# Patient Record
Sex: MALE | Race: WHITE | Employment: OTHER | URBAN - METROPOLITAN AREA
[De-identification: names, ages, dates, MRNs, and addresses within clinical notes are randomized per-mention and may not be internally consistent; named-entity substitution may affect disease eponyms.]

---

## 2017-02-10 PROBLEM — R07.89 CHEST DISCOMFORT: Status: ACTIVE | Noted: 2017-02-10

## 2017-02-10 PROBLEM — I44.0 FIRST DEGREE AV BLOCK: Status: ACTIVE | Noted: 2017-02-10

## 2017-02-10 PROBLEM — Z88.9 H/O SEASONAL ALLERGIES: Status: ACTIVE | Noted: 2017-02-10

## 2017-02-23 PROBLEM — R06.02 SHORTNESS OF BREATH: Status: ACTIVE | Noted: 2017-02-23

## 2017-03-23 PROBLEM — I35.1 NONRHEUMATIC AORTIC VALVE INSUFFICIENCY: Status: ACTIVE | Noted: 2017-03-23

## 2017-08-30 PROBLEM — Z00.00 ROUTINE GENERAL MEDICAL EXAMINATION AT A HEALTH CARE FACILITY: Status: ACTIVE | Noted: 2017-08-30

## 2018-03-01 PROBLEM — G89.29 CHRONIC LEFT SHOULDER PAIN: Status: ACTIVE | Noted: 2018-03-01

## 2018-03-01 PROBLEM — M25.512 CHRONIC LEFT SHOULDER PAIN: Status: ACTIVE | Noted: 2018-03-01

## 2018-03-07 PROBLEM — N13.8 BPH WITH OBSTRUCTION/LOWER URINARY TRACT SYMPTOMS: Status: ACTIVE | Noted: 2018-03-07

## 2018-03-07 PROBLEM — N40.1 BPH WITH OBSTRUCTION/LOWER URINARY TRACT SYMPTOMS: Status: ACTIVE | Noted: 2018-03-07

## 2018-09-14 PROBLEM — Z85.820 HISTORY OF MELANOMA EXCISION: Status: ACTIVE | Noted: 2018-09-14

## 2018-09-14 PROBLEM — Z11.59 SCREENING FOR VIRAL DISEASE: Status: ACTIVE | Noted: 2018-09-14

## 2018-09-14 PROBLEM — Z23 ENCOUNTER FOR IMMUNIZATION: Status: ACTIVE | Noted: 2018-09-14

## 2018-09-14 PROBLEM — Z98.890 HISTORY OF MELANOMA EXCISION: Status: ACTIVE | Noted: 2018-09-14

## 2019-01-08 PROBLEM — M54.32 SCIATICA OF LEFT SIDE: Status: ACTIVE | Noted: 2019-01-08

## 2019-03-11 ENCOUNTER — HOSPITAL ENCOUNTER (OUTPATIENT)
Dept: LAB | Age: 74
Discharge: HOME | End: 2019-03-11
Payer: MEDICARE

## 2019-03-11 ENCOUNTER — HOSPITAL ENCOUNTER (OUTPATIENT)
Dept: GENERAL RADIOLOGY | Age: 74
Discharge: HOME | End: 2019-03-11

## 2019-03-11 DIAGNOSIS — R19.5 CHANGE IN STOOL: ICD-10-CM

## 2019-03-11 DIAGNOSIS — R05.3 PERSISTENT COUGH: ICD-10-CM

## 2019-03-11 LAB
BASOPHILS # BLD: 0 K/UL (ref 0–0.2)
BASOPHILS NFR BLD: 0 % (ref 0–2)
DIFFERENTIAL METHOD BLD: ABNORMAL
EOSINOPHIL # BLD: 0.1 K/UL (ref 0–0.8)
EOSINOPHIL NFR BLD: 1 % (ref 0.5–7.8)
ERYTHROCYTE [DISTWIDTH] IN BLOOD BY AUTOMATED COUNT: 13.3 % (ref 11.9–14.6)
HCT VFR BLD AUTO: 46.6 % (ref 41.1–50.3)
HGB BLD-MCNC: 16.4 G/DL (ref 13.6–17.2)
IMM GRANULOCYTES # BLD AUTO: 0.1 K/UL (ref 0–0.5)
IMM GRANULOCYTES NFR BLD AUTO: 1 % (ref 0–5)
LYMPHOCYTES # BLD: 3 K/UL (ref 0.5–4.6)
LYMPHOCYTES NFR BLD: 30 % (ref 13–44)
MCH RBC QN AUTO: 32.1 PG (ref 26.1–32.9)
MCHC RBC AUTO-ENTMCNC: 35.2 G/DL (ref 31.4–35)
MCV RBC AUTO: 91.2 FL (ref 79.6–97.8)
MONOCYTES # BLD: 0.8 K/UL (ref 0.1–1.3)
MONOCYTES NFR BLD: 8 % (ref 4–12)
NEUTS SEG # BLD: 6.2 K/UL (ref 1.7–8.2)
NEUTS SEG NFR BLD: 60 % (ref 43–78)
NRBC # BLD: 0 K/UL (ref 0–0.2)
PLATELET # BLD AUTO: 382 K/UL (ref 150–450)
PMV BLD AUTO: 9.3 FL (ref 9.4–12.3)
RBC # BLD AUTO: 5.11 M/UL (ref 4.23–5.6)
WBC # BLD AUTO: 10.3 K/UL (ref 4.3–11.1)

## 2019-03-11 PROCEDURE — 36415 COLL VENOUS BLD VENIPUNCTURE: CPT

## 2019-03-11 PROCEDURE — 85025 COMPLETE CBC W/AUTO DIFF WBC: CPT

## 2019-03-11 NOTE — PROGRESS NOTES
Pt was made aware of his lab work and also his x-ray. GI has called pt and pt has jessica there tomorrow at 2:15.

## 2019-03-15 NOTE — PROGRESS NOTES
Pt informed me since taking his cough med he is feeling \"much better, less coughing and getting much needed sleep\"/   FYI: Pt saw GI and had consultation done and he is scheduled for EGD 3/27/19.

## 2019-03-22 PROBLEM — J68.3 REACTIVE AIRWAYS DYSFUNCTION SYNDROME WITH ACUTE EXACERBATION (HCC): Status: ACTIVE | Noted: 2019-03-22

## 2019-03-22 PROBLEM — I35.1 AORTIC VALVE REGURGITATION: Status: ACTIVE | Noted: 2019-03-22

## 2019-03-22 PROBLEM — Z13.6 SCREENING FOR AAA (ABDOMINAL AORTIC ANEURYSM): Status: ACTIVE | Noted: 2019-03-22

## 2019-03-26 ENCOUNTER — HOSPITAL ENCOUNTER (OUTPATIENT)
Dept: LAB | Age: 74
Discharge: HOME | End: 2019-03-26

## 2019-03-26 PROCEDURE — 88305 TISSUE EXAM BY PATHOLOGIST: CPT

## 2019-03-26 PROCEDURE — 88312 SPECIAL STAINS GROUP 1: CPT

## 2019-03-27 ENCOUNTER — HOSPITAL ENCOUNTER (OUTPATIENT)
Dept: ULTRASOUND IMAGING | Age: 74
Discharge: HOME | End: 2019-03-27
Attending: INTERNAL MEDICINE

## 2019-03-27 DIAGNOSIS — Z13.6 SCREENING FOR AAA (ABDOMINAL AORTIC ANEURYSM): ICD-10-CM

## 2019-04-02 ENCOUNTER — HOSPITAL ENCOUNTER (OUTPATIENT)
Dept: PHYSICAL THERAPY | Age: 74
Discharge: HOME | End: 2019-04-02
Attending: INTERNAL MEDICINE
Payer: MEDICARE

## 2019-04-02 DIAGNOSIS — M54.32 SCIATICA OF LEFT SIDE: ICD-10-CM

## 2019-04-02 PROCEDURE — 97161 PT EVAL LOW COMPLEX 20 MIN: CPT

## 2019-04-02 PROCEDURE — 97110 THERAPEUTIC EXERCISES: CPT

## 2019-04-02 NOTE — PROGRESS NOTES
Edwin Segundo  : 1945  [place]  Secondary: Darren Suggs at [facility], [address] [facility]  [phone]   [phone]      OUTPATIENT PHYSICAL THERAPY: Daily Treatment Note 2019  Pre-treatment Symptoms/Complaints:  L sided sciatica  Pain: Initial: Pain Intensity 1: 9 /10 Post Session:  9/10   Medications Last Reviewed:  2019  Updated Objective Findings:  See evaluation note from today   TREATMENT:     Therapeutic Exercise: (25 Minutes):  Exercises per grid below to improve mobility and strength. Required moderate verbal and manual cues to promote proper body alignment and promote proper body mechanics. Progressed range as indicated. Date:  2019   Activity/Exercise Parameters   Thoracic rotation 5 minutes   Lumbar rotation 5 minutes   Knee side to side 5 minutes   Piriformis stretch 5 minutes   Quadriceps stretch 5 minutes               MedBridge Portal  Treatment/Session Summary:    · Response to Treatment:  Pt. tolerates intial flexibility program without complaints today. No reproduction of symptoms occurs today. .  · Communication/Consultation:  None today  · Equipment provided today:  None today  · Recommendations/Intent for next treatment session: Next visit will focus on Flexibility and ROM exercises.  .  Treatment Plan of Care Effective Dates:  19 to 19  Total Treatment Billable Duration:  25 minutes therapeutic exercise, 35 minutes evaluation  PT Patient Time In/Time Out  Time In: 1130  Time Out: [address], PT    Future Appointments   Date Time Provider Miguelito Hawthorne   4/10/2019 10:30 AM Eduin Rodolfo, PT [facility]   2019  8:30 AM Eduin Rodolfo, PT [facility]   2019  9:30 AM Eduin Rodolfo, PT [facility]   2019 10:30 AM [place] Rodolfo, PT [facility]   2019  9:30 AM [place] Rodolfo, PT [facility]   2019 10:30 AM Adenike Best, PT [facility]   5/1/2019  9:30 AM Adenike Best, PT [facility]   5/2/2019 10:30 AM Adenike Best, PT [facility]   6/19/2019 10:00 AM Timmy Galvan MD [facility]   3/25/2020 10:10 AM Shante Fuentes MD [facility]

## 2019-04-02 NOTE — THERAPY EVALUATION
Minoo Velez  : 1945  [place]  Secondary: Darren Mendoza 75 at [address]  [phone]   [phone]        OUTPATIENT PHYSICAL THERAPY:Initial Assessment 2019   ICD-10: Treatment Diagnosis: Sciatica, L side [M54.32], Low Back Pain [M54.5]  Precautions/Allergies:   Latex; Adhesive; Efudex [fluorouracil]; Nexium [esomeprazole magnesium]; Paba [p-aminobenzoic acid]; Retin-a [tretinoin]; and Viagra [sildenafil]   MD Orders: Evaluate and Treat MEDICAL/REFERRING DIAGNOSIS:  Sciatica of left side [M54.32]   DATE OF ONSET: 2019  REFERRING PHYSICIAN: Gian Rodgers MD  RETURN PHYSICIAN APPOINTMENT: TBD     INITIAL ASSESSMENT:  Mr. Rand Mcintyre presents with signs and symptoms of L LE sciatica. Source of L LE sciatica is undetermined at this time. Lumbar testing is (-) with absence of reproduction of symptoms with repeated lumbar flexion or extension, absence of reproduction with central p/a to the lumbar spine, (-) slump testing, and negative SLR testing. SI joint provocation is negative additionally. Pt. Does not demonstrates tenderness along the piriformis, but does present with flexibility limitations in the L hip external rotators. Other finding include general flexibility and ROM deficits in the hip and spine. Pt. Will benefit from a flexibility/mobility program to address pain and improve functional walking endurance without pain. PROBLEM LIST (Impacting functional limitations):  1. Decreased Strength  2. Decreased ADL/Functional Activities  3. Increased Pain  4. Decreased Flexibility/Joint Mobility  5. Decreased [place] with Home Exercise Program INTERVENTIONS PLANNED: (Treatment may consist of any combination of the following)  1. Home Exercise Program (HEP)  2. Manual Therapy  3. Range of Motion (ROM)  4.  Therapeutic Exercise/Strengthening   TREATMENT PLAN:  Effective Dates: 4/2/2019 TO 7/1/2019 (90 days). Frequency/Duration: 2 times a week for 90 Day(s)  GOALS: (Goals have been discussed and agreed upon with patient.)  Discharge Goals: Time Frame: 8 weeks  1. Pt. Will walk for >15 minutes with 0/10 L LE pain to improve walking capacity. 2. Pt. Will be independent with HEP to improve pain and prevent return of symptoms. 3. Pt. Will score <20% on the RAYMOND to demonstrate improved pain and function. OUTCOME MEASURE:   Tool Used: Modified Oswestry Low Back Pain Questionnaire  Score:  Initial: 13/50  Most Recent: X/50 (Date: -- )   Interpretation of Score: Each section is scored on a 0-5 scale, 5 representing the greatest disability. The scores of each section are added together for a total score of 50. MEDICAL NECESSITY:   · Patient is expected to demonstrate progress in strength and range of motion to increase independence with standing, walking, and ADL's.  REASON FOR SERVICES/OTHER COMMENTS:  · Patient will benefit from skilled PT to address impairments identified through evaluation and return to previous ADL and recreational capacity. Total Duration:  PT Patient Time In/Time Out  Time In: 1130  Time Out: 1230    Rehabilitation Potential For Stated Goals: Good       PAIN/SUBJECTIVE:   Initial: Pain Intensity 1: 9 /10 Post Session:  9/10   HISTORY:   History of Injury/Illness (Reason for Referral):  Pt. Attends PT c/o L sided LBP with radicular symptoms traveling down the gluteal region, hamstring, lateral calf, and bottom of the foot. Pt. Reports gradual insidious onset in mid February. The pain is aggravated with prolonged walking (>15 minutes) and with prolonged standing. The pain takes a while to subside after sitting and the only thing that helps is using a heating pad. Pt. Notes steroid dose pack failed to improve symptoms. Pt. Would like to address pain with walking and resume normal recreational and ADL activities.    Past Medical History/Comorbidities:   Mr. Sourav Matt  has a past medical history of Abnormal glucose (5/26/2015), Actinic keratoses (7/5/2016), Benign essential HTN (5/26/2015), BPH (benign prostatic hypertrophy) (5/26/2015), Calculus of kidney (5/26/2015), Chronic insomnia (5/26/2015), Degeneration of cervical intervertebral disc (5/26/2015), Edema (5/26/2015), Elevated PSA (4/22/2015), Essential tremor (5/26/2015), GERD (gastroesophageal reflux disease) (5/26/2015), Hyperlipidemia, Impotence of organic origin, Mild sleep apnea (5/26/2015), Skin cancer (8/1/2016), and SNHL (sensorineural hearing loss) (8/1/2016). Mr. Sourav Matt  has a past surgical history that includes hx orthopaedic; hx prostatectomy; hx heent; hx urological (2007); hx urological (2012); hx malignant skin lesion excision; hx hernia repair (05/2006); hx other surgical (03/2013); and hx other surgical.  Social History/Living Environment:     Lives with spouse in two story home. Prior Level of Function/Work/Activity:  Functioned independently without limitations. Ambulatory/Rehab Services H2 Model Falls Risk Assessment   Risk Factors:       (1)  Gender [Male] Ability to Rise from Chair:       (0)  Ability to rise in a single movement   Falls Prevention Plan:       No modifications necessary   Total: (5 or greater = High Risk): 1   ©2010 [facility]. All Rights Reserved. [facility] Patent #[id number]. Federal Law prohibits the replication, distribution or use without written permission from [facility]   Current Medications:       Current Outpatient Medications:     albuterol (PROVENTIL HFA, VENTOLIN HFA, PROAIR HFA) 90 mcg/actuation inhaler, Take 1 Puff by inhalation every four (4) hours as needed for Wheezing., Disp: 1 Inhaler, Rfl: 3    omeprazole (PRILOSEC) 20 mg capsule, Take 20 mg by mouth daily. , Disp: , Rfl:     tamsulosin (FLOMAX) 0.4 mg capsule, Take 1 Cap by mouth daily. , Disp: 90 Cap, Rfl: 4    atorvastatin (LIPITOR) 10 mg tablet, Take 10 mg by mouth daily. , Disp: , Rfl:     montelukast (SINGULAIR) 10 mg tablet, TAKE 1 TABLET BY MOUTH  DAILY, Disp: 90 Tab, Rfl: 4    amLODIPine (NORVASC) 10 mg tablet, Take 1 Tab by mouth daily. Indications: hypertension, Disp: 90 Tab, Rfl: 3    triamterene-hydroCHLOROthiazide (MAXZIDE) 37.5-25 mg per tablet, Take 1 Tab by mouth daily. , Disp: 90 Tab, Rfl: 3    fexofenadine (ALLEGRA) 180 mg tablet, Take 180 mg by mouth daily. , Disp: , Rfl:     aspirin 81 mg tablet, Take 81 mg by mouth daily. , Disp: , Rfl:    Date Last Reviewed:  4/2/2019   Number of Personal Factors/Comorbidities that affect the Plan of Care: 0: LOW COMPLEXITY   EXAMINATION:   OBSERVATION: --    Palpation/Joint Mobility:   Left Right   Lumbar Paraspinals -- --   Quadratus Lumborum -- --   Gluteals -- --   Thoracic Spine normal normal   Lumbar Spine hypomobile hypomobile     Range of Motion: Lumbar  Flexion 80  Repeated Movement   Extension 25  Flex: (-)   Side Bend L: 20 R: 20 Ext: (-)     Range of Motion: Hip  Extension limited limited   Flexion -- --   Internal Rotation limited limited   External Rotation -- --     Strength:   Left Right   Hip Extensors 4/5 4/5   Hip Abductors 4/5 4/5   Functional           Other:       Flexibility:                                                                             Neuroscreen:  Quadriceps  limited Reflexes Sensation   Iliopsoas limited L2-4: 1+ normal   Hamstrings limited S1-2: 1+    Gluteals -- Babinski:--    Triceps Surae limited       Special Testing:   Left Right   Straight Leg Raise (-) (-)   Slump (-) (-)   Femoral Nerve  (-) (-)   Aberrant Movements     Prone Instability Test     TATIANA (+) (+)   JORDYN Curiel (+) (+)        Body Structures Involved:  1. Joints  2. Muscles Body Functions Affected:  1. Neuromusculoskeletal  2. Movement Related Activities and Participation Affected:  1. Mobility  2.  Self Care   Number of elements (examined above) that affect the Plan of Care: 1-2: LOW COMPLEXITY   CLINICAL PRESENTATION:   Presentation: Stable and uncomplicated: LOW COMPLEXITY   CLINICAL DECISION MAKING:   Use of outcome tool(s) and clinical judgement create a POC that gives a: Clear prediction of patient's progress: LOW COMPLEXITY

## 2019-04-10 ENCOUNTER — APPOINTMENT (OUTPATIENT)
Dept: PHYSICAL THERAPY | Age: 74
End: 2019-04-10
Attending: INTERNAL MEDICINE
Payer: MEDICARE

## 2019-04-11 ENCOUNTER — HOSPITAL ENCOUNTER (OUTPATIENT)
Dept: PHYSICAL THERAPY | Age: 74
Discharge: HOME | End: 2019-04-11
Attending: INTERNAL MEDICINE
Payer: MEDICARE

## 2019-04-11 PROCEDURE — 97140 MANUAL THERAPY 1/> REGIONS: CPT

## 2019-04-11 PROCEDURE — 97110 THERAPEUTIC EXERCISES: CPT

## 2019-04-11 NOTE — PROGRESS NOTES
Lex Sumner  : 1945  [place]  Secondary: Darren Suggs at [address]  [phone]   [phone]      OUTPATIENT PHYSICAL THERAPY: Daily Treatment Note 2019  Pre-treatment Symptoms/Complaints:  Pt. Reports he has not been able to work on HEP due to surgery to remove a skin cancer last week. Pain: Initial: Pain Intensity 1: 8 /10 Post Session:  8/10   Medications Last Reviewed:  2019  Updated Objective Findings:  Pt. walks for 3 minutes prior to onset of L LE radicular symptoms   TREATMENT:     Therapeutic Exercise: (40 Minutes):  Exercises per grid below to improve mobility and strength. Required moderate verbal and manual cues to promote proper body alignment and promote proper body mechanics. Progressed range as indicated. Date:  2019   Activity/Exercise Parameters   Thoracic rotation --   Lumbar rotation 5 minutes   Knee side to side 5 minutes   Piriformis stretch 5 minutes   Quadriceps stretch 5 minutes   Sciatic nerve glide 3 x 10   Sidelying hip abduction 3 x 10   walking 6 minutes   Moisés test stretch 4 minutes   Pelvic tilting 3 x 10   Lumbar flexion 3 minutes   Manual Therapy (    Soft Tissue Mobilization Duration  Duration: 15 Minutes): Manual techniques to facilitate improved motion and decreased pain. (Used abbreviations: MET - muscle energy technique; PNF - proprioceptive neuromuscular facilitation; NMR - neuromuscular re-education; a/p - anterior to posterior; p/a - posterior to anterior)   · Soft tissue mobilization: L gluteals and piriformis muscle  · Joint mobilization: posterior glides of L hip in supine grade III  · Joint mobilization: lumbar spine rotation in sidelying grade IV      MedBridge Portal  Treatment/Session Summary:    · Response to Treatment:  Radicular symptoms are only produced during walking today.   symptoms centralize with seated lumbar flexion today,  . · Communication/Consultation:  None today  · Equipment provided today:  None today  · Recommendations/Intent for next treatment session: Next visit will focus on Flexibility and ROM exercises.  .  Treatment Plan of Care Effective Dates:  4/2/19 to 7/1/19  Total Treatment Billable Duration:  55 minutes  PT Patient Time In/Time Out  Time In: 0830  Time Out: [address] [facility], PT    Future Appointments   Date Time Provider Miguelito Hawthorne   4/16/2019  9:30 AM Kaleta Burner, PT [facility]   4/18/2019 10:30 AM Kaleta Burner, PT [facility]   4/23/2019  9:30 AM Kaleta Burner, PT [facility]   4/25/2019 10:30 AM Kaleta Burner, PT [facility]   4/30/2019  9:30 AM Kaleta Burner, PT [facility]   5/1/2019  9:30 AM Kaleta Burner, PT [facility]   5/2/2019 10:30 AM Kaleta Burner, PT [facility]   5/7/2019  9:30 AM Kaleta Burner, PT [facility]   6/19/2019 10:00 AM Ayan Blum MD [facility]   3/25/2020 10:10 AM Wanda Coburn MD [facility] PGT PGU

## 2019-04-16 ENCOUNTER — HOSPITAL ENCOUNTER (OUTPATIENT)
Dept: PHYSICAL THERAPY | Age: 74
Discharge: HOME | End: 2019-04-16
Attending: INTERNAL MEDICINE
Payer: MEDICARE

## 2019-04-16 PROCEDURE — 97110 THERAPEUTIC EXERCISES: CPT

## 2019-04-16 PROCEDURE — 97140 MANUAL THERAPY 1/> REGIONS: CPT

## 2019-04-16 NOTE — PROGRESS NOTES
Alex Cassette  : 1945  [place]  Secondary: Darren Suggs at [address]  [phone]   [phone]      OUTPATIENT PHYSICAL THERAPY: Daily Treatment Note 2019  Pre-treatment Symptoms/Complaints:  Pt. Notes some improvement in L LE symptoms since last PT session. Pt. Notes he was pain free on Saturday. Pain: Initial: Pain Intensity 1: 7 /10 Post Session:  7/10   Medications Last Reviewed:  2019  Updated Objective Findings:  None Today   TREATMENT:     Therapeutic Exercise: (40 Minutes):  Exercises per grid below to improve mobility and strength. Required moderate verbal and manual cues to promote proper body alignment and promote proper body mechanics. Progressed range as indicated. Date:  2019   Activity/Exercise Parameters   Thoracic rotation --   Lumbar rotation 5 minutes   Knee side to side 5 minutes   Piriformis stretch 5 minutes   Quadriceps stretch 5 minutes   Sciatic nerve glide 3 x 10   Sidelying hip abduction 3 x 10   walking --   Moisés test stretch 4 minutes   Pelvic tilting 3 x 10   Lumbar flexion 3 minutes   clams 3 x 15       Manual Therapy (    Soft Tissue Mobilization Duration  Duration: 15 Minutes): Manual techniques to facilitate improved motion and decreased pain. (Used abbreviations: MET - muscle energy technique; PNF - proprioceptive neuromuscular facilitation; NMR - neuromuscular re-education; a/p - anterior to posterior; p/a - posterior to anterior)   · Soft tissue mobilization: L gluteals and piriformis muscle  · Joint mobilization: posterior glides of L hip in supine grade III  · Joint mobilization: lumbar spine rotation in sidelying grade IV      MedBridge Portal  Treatment/Session Summary:    · Response to Treatment:  Pt. tolerates flexibility and ROM exercises today without complaints.   Pt. has difficulty with pelvic tilt secondary to flexibility limitations in the hip and lumbar spine. .  · Communication/Consultation:  None today  · Equipment provided today:  None today  · Recommendations/Intent for next treatment session: Next visit will focus on Flexibility and ROM exercises.  .  Treatment Plan of Care Effective Dates:  4/2/19 to 7/1/19  Total Treatment Billable Duration:  55 minutes  PT Patient Time In/Time Out  Time In: 0930  Time Out: [address], PT    Future Appointments   Date Time Provider Miguelito Hawthorne   4/18/2019 10:30 AM Junious Jumbo, PT [facility]   4/23/2019  9:30 AM Junious Jumbo, PT [facility]   4/25/2019 10:30 AM Junious Jumbo, PT [facility]   4/30/2019  9:30 AM Junious Jumbo, PT [facility]   5/1/2019  9:30 AM Junious Jumbo, PT [facility]   5/2/2019 10:30 AM Junious Jumbo, PT [facility]   5/7/2019  9:30 AM Junious Jumbo, PT [facility]   6/19/2019 10:00 AM Rk Garcia MD [facility]   3/25/2020 10:10 AM Tarsha Aguiar MD [facility]

## 2019-04-18 ENCOUNTER — HOSPITAL ENCOUNTER (OUTPATIENT)
Dept: PHYSICAL THERAPY | Age: 74
Discharge: HOME | End: 2019-04-18
Attending: INTERNAL MEDICINE
Payer: MEDICARE

## 2019-04-18 PROCEDURE — 97140 MANUAL THERAPY 1/> REGIONS: CPT

## 2019-04-18 PROCEDURE — 97110 THERAPEUTIC EXERCISES: CPT

## 2019-04-18 NOTE — PROGRESS NOTES
Saima Farias  : 1945  [place]  Secondary: Darren Mendoza [address] at [address]  [phone]   [phone]      OUTPATIENT PHYSICAL THERAPY: Daily Treatment Note 2019  Pre-treatment Symptoms/Complaints:  Pt. Reports moderate global soreness following last PT session. Pt. Notes pain remains consistent in the AM upon rising. Pain: Initial: Pain Intensity 1: 7 /10 Post Session:  7/10   Medications Last Reviewed:  2019  Updated Objective Findings:  Thoracic and lumbar spine hypomobile   TREATMENT:     Therapeutic Exercise: (40 Minutes):  Exercises per grid below to improve mobility and strength. Required moderate verbal and manual cues to promote proper body alignment and promote proper body mechanics. Progressed range as indicated. Date:  2019   Activity/Exercise Parameters   Thoracic rotation --   Lumbar rotation 5 minutes   Knee side to side 5 minutes   Piriformis stretch 5 minutes   Quadriceps stretch 5 minutes   Sciatic nerve glide --   Sidelying hip abduction 3 x 10   walking --   Moisés test stretch 4 minutes   Pelvic tilting 3 x 10   Lumbar flexion 3 minutes   clams 3 x 15   Sit to stand 3 x 10   Manual Therapy (    Soft Tissue Mobilization Duration  Duration: 15 Minutes): Manual techniques to facilitate improved motion and decreased pain.  (Used abbreviations: MET - muscle energy technique; PNF - proprioceptive neuromuscular facilitation; NMR - neuromuscular re-education; a/p - anterior to posterior; p/a - posterior to anterior)   · Soft tissue mobilization: L gluteals and piriformis muscle  · Joint mobilization: posterior glides of L hip in supine grade III  · Joint mobilization: lumbar spine rotation in sidelying grade IV      MedBridge Portal  Treatment/Session Summary:    · Response to Treatment:  Pt. tolerates manual therapy and flexibility exercises without complaints. Intensity of L LE pain appears improved this week. .  · Communication/Consultation:  None today  · Equipment provided today:  None today  · Recommendations/Intent for next treatment session: Next visit will focus on Flexibility and ROM exercises.  .  Treatment Plan of Care Effective Dates:  4/2/19 to 7/1/19  Total Treatment Billable Duration:  55 minutes  PT Patient Time In/Time Out  Time In: 1030  Time Out: [facility], PT    Future Appointments   Date Time Provider Miguelito Hawthorne   4/23/2019  9:30 AM Junious Jumbo, PT [facility]   4/25/2019 10:30 AM Junious Jumbo, PT [facility]   4/30/2019  9:30 AM Junious Jumbo, PT [facility]   5/1/2019  9:30 AM Junious Jumbo, PT [facility]   5/2/2019 10:30 AM Junious Jumbo, PT [facility]   5/7/2019  9:30 AM Junious Jumbo, PT [facility]   6/19/2019 10:00 AM Rk Garcia MD [facility]   3/25/2020 10:10 AM Tarsha Aguiar MD [facility]

## 2019-04-23 ENCOUNTER — HOSPITAL ENCOUNTER (OUTPATIENT)
Dept: PHYSICAL THERAPY | Age: 74
Discharge: HOME | End: 2019-04-23
Attending: INTERNAL MEDICINE
Payer: MEDICARE

## 2019-04-23 PROCEDURE — 97110 THERAPEUTIC EXERCISES: CPT

## 2019-04-23 PROCEDURE — 97140 MANUAL THERAPY 1/> REGIONS: CPT

## 2019-04-23 NOTE — PROGRESS NOTES
Cathy Crockett  : 1945  [place]  Secondary: Darren Suggs at [address]  [phone]   [phone]      OUTPATIENT PHYSICAL THERAPY: Daily Treatment Note 2019  Pre-treatment Symptoms/Complaints:  Pt. Notes some improvement of L gluteal pain upon rising but notes pain returned with walking a 1/4 of a mile. Pain: Initial: Pain Intensity 1: 7 /10 Post Session:  7/10   Medications Last Reviewed:  2019  Updated Objective Findings:  None Today   TREATMENT:     Therapeutic Exercise: (40 Minutes):  Exercises per grid below to improve mobility and strength. Required moderate verbal and manual cues to promote proper body alignment and promote proper body mechanics. Progressed range as indicated. Date:  2019   Activity/Exercise Parameters   Thoracic rotation --   Lumbar rotation 5 minutes   Knee side to side --   Piriformis stretch 5 minutes   Quadriceps stretch 5 minutes   Sciatic nerve glide 3 x 10   Sidelying hip abduction 3 x 10   walking 4 minutes   Moisés test stretch 4 minutes   Pelvic tilting 3 x 10   Lumbar flexion 3 minutes   clams --   Sit to stand 3 x 10   Hip extension 3 x 15   Quadruped LE extensions 3 x 15   Manual Therapy (    Soft Tissue Mobilization Duration  Duration: 15 Minutes): Manual techniques to facilitate improved motion and decreased pain.  (Used abbreviations: MET - muscle energy technique; PNF - proprioceptive neuromuscular facilitation; NMR - neuromuscular re-education; a/p - anterior to posterior; p/a - posterior to anterior)   · Soft tissue mobilization: L gluteals and piriformis muscle  · Joint mobilization: posterior glides of L hip in supine grade III  · Joint mobilization: lumbar spine rotation in sidelying grade IV      MedBridge Portal  Treatment/Session Summary:    · Response to Treatment:  Pt. experiences L LE symptoms during 4 minute walk. Pain is mostly located in the L calf with dull ache in hamstring and gluteal region. Pain improves with lumbar flexion ROM. Pt. is initiated on increased gluteal and hip strengthening exercises today. .  · Communication/Consultation:  None today  · Equipment provided today:  None today  · Recommendations/Intent for next treatment session: Next visit will focus on Flexibility and ROM exercises with increase in LE strengthening.   Treatment Plan of Care Effective Dates:  4/2/19 to 7/1/19  Total Treatment Billable Duration:  55 minutes  PT Patient Time In/Time Out  Time In: 0930  Time Out: [address] [facility],     Future Appointments   Date Time Provider Miguelito Hawthorne   4/25/2019 10:30 AM Jhoan Epp, PT [facility]   4/30/2019  9:30 AM Jhoan Epp, PT [facility]   5/1/2019  9:30 AM Jhoan Epp, PT [facility]   5/2/2019 10:30 AM [place] Epp, PT [facility]   5/7/2019  9:30 AM Jhoan Epp, PT [facility]   6/19/2019 10:00 AM Rodger Fagan MD [facility]   3/25/2020 10:10 AM Jered Gong MD [facility]

## 2019-04-25 ENCOUNTER — APPOINTMENT (OUTPATIENT)
Dept: PHYSICAL THERAPY | Age: 74
End: 2019-04-25
Attending: INTERNAL MEDICINE
Payer: MEDICARE

## 2019-04-30 ENCOUNTER — HOSPITAL ENCOUNTER (OUTPATIENT)
Dept: PHYSICAL THERAPY | Age: 74
Discharge: HOME | End: 2019-04-30
Attending: INTERNAL MEDICINE
Payer: MEDICARE

## 2019-04-30 PROCEDURE — 97140 MANUAL THERAPY 1/> REGIONS: CPT

## 2019-04-30 PROCEDURE — 97110 THERAPEUTIC EXERCISES: CPT

## 2019-04-30 NOTE — PROGRESS NOTES
Alex Cassette  : 1945  [place]  Secondary: Darren Suggs at [address]  [phone]   [phone]      OUTPATIENT PHYSICAL THERAPY: Daily Treatment Note 2019  Pre-treatment Symptoms/Complaints:  Pt. Notes pain increased last Saturday and he has been experiencing increased pain intermittently over the past week. Pain: Initial: Pain Intensity 1: 7 /10 Post Session:  7/10   Medications Last Reviewed:  2019  Updated Objective Findings:  Repeated lumbar flexion (-), Repeated lumbar extension (-), central P/A to lumbar spine (-), slump testing (-), SLR (-)   TREATMENT:     Therapeutic Exercise: (30 Minutes):  Exercises per grid below to improve mobility and strength. Required moderate verbal and manual cues to promote proper body alignment and promote proper body mechanics. Progressed range as indicated. Date:  2019   Activity/Exercise Parameters   Thoracic rotation --   Lumbar rotation 5 minutes   Knee side to side --   Piriformis stretch 5 minutes   Quadriceps stretch 5 minutes   Sciatic nerve glide 3 x 10   Sidelying hip abduction --   walking 4 minutes   Moisés test stretch 4 minutes   Pelvic tilting --   Lumbar flexion 3 minutes   clams --   Sit to stand --   Hip extension --   Quadruped LE extensions --   Manual Therapy (    Soft Tissue Mobilization Duration  Duration: 25 Minutes): Manual techniques to facilitate improved motion and decreased pain.  (Used abbreviations: MET - muscle energy technique; PNF - proprioceptive neuromuscular facilitation; NMR - neuromuscular re-education; a/p - anterior to posterior; p/a - posterior to anterior)   · Soft tissue mobilization: L gluteals, hamstrings and piriformis muscle  · Joint mobilization: posterior glides of L hip in supine grade III  · Joint mobilization: lumbar spine rotation in sidelying grade IV      MedBridge Portal  Treatment/Session Summary:    · Response to Treatment:  Only provocative movements at this time include prolonged walking. PT cannot reproduce pain from lumbar spine with repeated movements or neural tensioning. Pt. may benefit from dry needling to address continued pain in the sciatic distribution. .  · Communication/Consultation:  None today  · Equipment provided today:  None today  · Recommendations/Intent for next treatment session: Next visit will focus on Flexibility and ROM exercises with increase in LE strengthening. Will consider dry needling.    Treatment Plan of Care Effective Dates:  4/2/19 to 7/1/19  Total Treatment Billable Duration:  55 minutes  PT Patient Time In/Time Out  Time In: 0930  Time Out: 289 [facility], PT    Future Appointments   Date Time Provider Miguelito Damoni   5/2/2019 10:30 AM [facility]   5/7/2019  9:30 AM Enio Ballesteros PT [facility]   5/9/2019  9:30 AM Enio Ballesteros PT [facility]   6/19/2019 10:00 AM Enrrique Neely MD [facility]   3/25/2020 10:10 AM Eliane Starks MD [facility] PGUHT PGU

## 2019-05-01 ENCOUNTER — APPOINTMENT (OUTPATIENT)
Dept: PHYSICAL THERAPY | Age: 74
End: 2019-05-01
Attending: INTERNAL MEDICINE
Payer: MEDICARE

## 2019-05-02 ENCOUNTER — HOSPITAL ENCOUNTER (OUTPATIENT)
Dept: PHYSICAL THERAPY | Age: 74
Discharge: HOME | End: 2019-05-02
Attending: INTERNAL MEDICINE
Payer: MEDICARE

## 2019-05-02 PROCEDURE — 97140 MANUAL THERAPY 1/> REGIONS: CPT

## 2019-05-02 PROCEDURE — 97110 THERAPEUTIC EXERCISES: CPT

## 2019-05-02 NOTE — PROGRESS NOTES
Miguel Tyson  : 1945  [place]  Secondary: Darren Mendoza 77 at [address], [address] [place] [address]  [phone]   [phone]      OUTPATIENT PHYSICAL THERAPY: Daily Treatment Note 2019  Pre-treatment Symptoms/Complaints:  Pt. Reports waking up with pain in the L calf this AM.  Pt. Does note he was able to do yardwork for 2.5 hours without symptoms. Pain: Initial: Pain Intensity 1: 7 /10 Post Session:  7/10   Medications Last Reviewed:  2019  Updated Objective Findings:  None Today   TREATMENT:     Therapeutic Exercise: (25 Minutes):  Exercises per grid below to improve mobility and strength. Required moderate verbal and manual cues to promote proper body alignment and promote proper body mechanics. Progressed range as indicated. Date:  2019   Activity/Exercise Parameters   Thoracic rotation --   Lumbar rotation 5 minutes   Knee side to side --   Piriformis stretch 5 minutes   Quadriceps stretch 5 minutes   Sciatic nerve glide --   Sidelying hip abduction --   walking --   Moisés test stretch 4 minutes   Pelvic tilting --   Lumbar flexion 3 minutes   clams 3 x 15   Sit to stand --   Hip extension --   Quadruped LE extensions --   Manual Therapy (    Soft Tissue Mobilization Duration  Duration: 30 Minutes): Manual techniques to facilitate improved motion and decreased pain. (Used abbreviations: MET - muscle energy technique; PNF - proprioceptive neuromuscular facilitation; NMR - neuromuscular re-education; a/p - anterior to posterior; p/a - posterior to anterior)   · Soft tissue mobilization: L gluteals, hamstrings and piriformis muscle  · Joint mobilization: posterior glides of L hip in supine grade III  · Joint mobilization: lumbar spine rotation in sidelying grade IV  · Instrument assisted soft tissue mobilization: B lumbar multifidi L5/S1, L gluteals, L hamstrings, L calf.       [employer] Portal  Treatment/Session Summary:    · Response to Treatment:  Pt. is initiated on dry needling techniques in the sciatic nerve distribution today. Pt. tolerates dry needling without complaints and minimal post treatment soreness. .  · Communication/Consultation:  None today  · Equipment provided today:  None today  · Recommendations/Intent for next treatment session: Next visit will focus on Flexibility and ROM exercises with increase in LE strengthening.      Treatment Plan of Care Effective Dates:  4/2/19 to 7/1/19  Total Treatment Billable Duration:  55 minutes  PT Patient Time In/Time Out  Time In: 1030  Time Out: Miguelito Arciniega PT    Future Appointments   Date Time Provider Miguelito Hawthorne   5/7/2019  9:30 AM Enio Ballesteros PT [facility]   5/9/2019  9:30 AM Enio Ballesteros PT [facility]   6/19/2019 10:00 AM Enrrique Neely MD [facility]   3/25/2020 10:10 AM Eliane Starks MD [facility] PGT PGU

## 2019-05-07 ENCOUNTER — HOSPITAL ENCOUNTER (OUTPATIENT)
Dept: PHYSICAL THERAPY | Age: 74
Discharge: HOME | End: 2019-05-07
Attending: INTERNAL MEDICINE
Payer: MEDICARE

## 2019-05-07 PROCEDURE — 97110 THERAPEUTIC EXERCISES: CPT

## 2019-05-07 PROCEDURE — 97140 MANUAL THERAPY 1/> REGIONS: CPT

## 2019-05-07 NOTE — PROGRESS NOTES
Halina Reyna  : 1945  [place]  Secondary: Darren Suggs at [address]  [address]  [phone]   [phone]      OUTPATIENT PHYSICAL THERAPY: Daily Treatment Note 2019  Pre-treatment Symptoms/Complaints:  Pt. Reports 2-3 days of improved L LE symptoms following last PT session. Pain: Initial: Pain Intensity 1: 6 /10 Post Session:  6/10   Medications Last Reviewed:  2019  Updated Objective Findings:  None Today   TREATMENT:     Therapeutic Exercise: (25 Minutes):  Exercises per grid below to improve mobility and strength. Required moderate verbal and manual cues to promote proper body alignment and promote proper body mechanics. Progressed range as indicated. Date:  2019   Activity/Exercise Parameters   Thoracic rotation --   Lumbar rotation 5 minutes   Knee side to side --   Piriformis stretch 5 minutes   Quadriceps stretch 5 minutes   Sciatic nerve glide --   Sidelying hip abduction 2 x 10   walking --   Moisés test stretch 4 minutes   Pelvic tilting --   Lumbar flexion 3 minutes   clams 3 x 15   Sit to stand --   Hip extension --   Quadruped LE extensions --   Manual Therapy (    Soft Tissue Mobilization Duration  Duration: 30 Minutes): Manual techniques to facilitate improved motion and decreased pain. (Used abbreviations: MET - muscle energy technique; PNF - proprioceptive neuromuscular facilitation; NMR - neuromuscular re-education; a/p - anterior to posterior; p/a - posterior to anterior)   · Soft tissue mobilization: L gluteals, hamstrings and piriformis muscle  · Joint mobilization: posterior glides of L hip in supine grade III  · Joint mobilization: lumbar spine rotation in sidelying grade IV  · Instrument assisted soft tissue mobilization: B lumbar multifidi L5/S1, L gluteals, L hamstrings, L calf.       MedBridge Portal  Treatment/Session Summary:    · Response to Treatment:  Pt. will benefit from resumption of hip strengthening exercises as pain improves. Pt. tolerates todays treatment without complaints. .  · Communication/Consultation:  None today  · Equipment provided today:  None today  · Recommendations/Intent for next treatment session: Next visit will focus on Flexibility and ROM exercises with increase in LE strengthening.      Treatment Plan of Care Effective Dates:  4/2/19 to 7/1/19  Total Treatment Billable Duration:  55 minutes  PT Patient Time In/Time Out  Time In: 0930  Time Out: 289 [facility],     Future Appointments   Date Time Provider Miguelito Hawthorne   5/9/2019  9:30 AM Vivek Bhakta PT [facility]   6/19/2019 10:00 AM Bibi Otero MD [facility] EDGARDO EDGARDO   3/25/2020 10:10 AM Zack Shannon MD [facility] PGT PGU

## 2019-05-09 ENCOUNTER — HOSPITAL ENCOUNTER (OUTPATIENT)
Dept: PHYSICAL THERAPY | Age: 74
Discharge: HOME | End: 2019-05-09
Attending: INTERNAL MEDICINE
Payer: MEDICARE

## 2019-05-09 PROCEDURE — 97110 THERAPEUTIC EXERCISES: CPT

## 2019-05-09 PROCEDURE — 97140 MANUAL THERAPY 1/> REGIONS: CPT

## 2019-05-09 NOTE — PROGRESS NOTES
Pamela Maloney  : 1945  [place]  Secondary: Darren Suggs at [facility], [address] [facility]  [phone]   [phone]      OUTPATIENT PHYSICAL THERAPY: Daily Treatment Note 2019  Pre-treatment Symptoms/Complaints:  Pt. Reports consistent pain this week with walking and upon waking up. Pain is less severe however and he can manage pain better with prescribed flexion exercises. Pain: Initial: Pain Intensity 1: 6 /10 Post Session:  6/10   Medications Last Reviewed:  2019  Updated Objective Findings:  See evaluation note from today   TREATMENT:     Therapeutic Exercise: (25 Minutes):  Exercises per grid below to improve mobility and strength. Required moderate verbal and manual cues to promote proper body alignment and promote proper body mechanics. Progressed range as indicated. Date:  2019   Activity/Exercise Parameters   Thoracic rotation --   Lumbar rotation 5 minutes   Knee side to side 3 x 10   Piriformis stretch 5 minutes   Quadriceps stretch 5 minutes   Sciatic nerve glide 3 x 10   Sidelying hip abduction 2 x 10   walking --   Moisés test stretch 4 minutes   Pelvic tilting --   Lumbar flexion 3 minutes   clams --   Sit to stand --   Hip extension --   Quadruped LE extensions --   Manual Therapy (    Soft Tissue Mobilization Duration  Duration: 30 Minutes): Manual techniques to facilitate improved motion and decreased pain.  (Used abbreviations: MET - muscle energy technique; PNF - proprioceptive neuromuscular facilitation; NMR - neuromuscular re-education; a/p - anterior to posterior; p/a - posterior to anterior)   · Soft tissue mobilization: L gluteals, hamstrings and piriformis muscle  · Joint mobilization: posterior glides of L hip in supine grade III  · Joint mobilization: lumbar spine rotation in sidelying grade IV  · Instrument assisted soft tissue mobilization: B lumbar multifidi L5/S1, L gluteals, L hamstrings, L calf. (NOT PERFORMED)      MedBridge Portal  Treatment/Session Summary:    · Response to Treatment:  Pt. tolerates manual therapy and therapeutic exercises today without complaints. Pt. Víctor Chen benefit from continued progression of mobility and strengthening exercise. · Communication/Consultation:  None today  · Equipment provided today:  None today  · Recommendations/Intent for next treatment session: Next visit will focus on Flexibility and ROM exercises with increase in LE strengthening.      Treatment Plan of Care Effective Dates:  4/2/19 to 7/1/19  Total Treatment Billable Duration:  55 minutes  PT Patient Time In/Time Out  Time In: 0930  Time Out: 289 [facility], PT    Future Appointments   Date Time Provider Miguelito Hawthorne   5/15/2019  3:30 PM Jhoan Epp, PT [facility]   5/22/2019  1:30 PM Jhoan Epp, PT [facility]   5/24/2019 11:30 AM Jhoan Epp, PT [facility]   5/28/2019  2:30 PM Jhoan Epp, PT [facility]   5/30/2019  2:30 PM [place] Epp, PT [facility]   6/19/2019 10:00 AM Rodger Fagan MD [facility]   3/25/2020 10:10 AM Jered Gong MD [facility] PGT PGU

## 2019-05-09 NOTE — PROGRESS NOTES
Sandy Reneeletitia  : 1945  [place]  Secondary: Darren Suggs at [address]  [phone]   [phone]        OUTPATIENT PHYSICAL THERAPY:Progress Report 2019   ICD-10: Treatment Diagnosis: Sciatica, L side [M54.32], Low Back Pain [M54.5]  Precautions/Allergies:   Latex; Adhesive; Efudex [fluorouracil]; Nexium [esomeprazole magnesium]; Paba [p-aminobenzoic acid]; Retin-a [tretinoin]; and Viagra [sildenafil]   MD Orders: Evaluate and Treat MEDICAL/REFERRING DIAGNOSIS:  Sciatica, left side [M54.32]   DATE OF ONSET: 2019  REFERRING PHYSICIAN: Modesta Buerger, MD  RETURN PHYSICIAN APPOINTMENT: TBD     INITIAL ASSESSMENT:  Mr. Allen Client presents with signs and symptoms of L LE sciatica. Source of L LE sciatica is undetermined at this time. Lumbar testing is (-) with absence of reproduction of symptoms with repeated lumbar flexion or extension, absence of reproduction with central p/a to the lumbar spine, (-) slump testing, and negative SLR testing. SI joint provocation is negative additionally. Pt. Does not demonstrates tenderness along the piriformis, but does present with flexibility limitations in the L hip external rotators. Other finding include general flexibility and ROM deficits in the hip and spine. Pt. Will benefit from a flexibility/mobility program to address pain and improve functional walking endurance without pain. 19 Progress Report: Pt. Attends 9 physical therapy visits. Pt. Continues to experience radicular L LE symptoms with prolonged standing and walking activities. Despite remaining pain, pt. Is able to perform prescribed exercises to reduce symptoms when present. Pt. Does experience short term relief of symptoms following PT session. Pt. Achieves significant improvements on the RAYMOND since starting PT.   Pt. Will benefit from continued skilled PT to address remaining pain and ADL deficits. PROBLEM LIST (Impacting functional limitations):  1. Decreased Strength  2. Decreased ADL/Functional Activities  3. Increased Pain  4. Decreased Flexibility/Joint Mobility  5. Decreased [place] with Home Exercise Program INTERVENTIONS PLANNED: (Treatment may consist of any combination of the following)  1. Home Exercise Program (HEP)  2. Manual Therapy  3. Range of Motion (ROM)  4. Therapeutic Exercise/Strengthening   TREATMENT PLAN:  Effective Dates: 4/2/2019 TO 7/1/2019 (90 days). Frequency/Duration: 2 times a week for 90 Day(s)  GOALS: (Goals have been discussed and agreed upon with patient.)  Discharge Goals: Time Frame: 8 weeks  1. Pt. Will walk for >15 minutes with 0/10 L LE pain to improve walking capacity. ONGOING  2. Pt. Will be independent with HEP to improve pain and prevent return of symptoms. ONGOING  3. Pt. Will score <20% on the RAYMOND to demonstrate improved pain and function. MET    OUTCOME MEASURE:   Tool Used: Modified Oswestry Low Back Pain Questionnaire  Score:  Initial: 13/50  Most Recent: 7/50  (Date: 5/9/19 )   Interpretation of Score: Each section is scored on a 0-5 scale, 5 representing the greatest disability. The scores of each section are added together for a total score of 50.       UPDATED OBJECTIVE FINDINGS:    Palpation/Joint Mobility:   Left Right   Lumbar Paraspinals -- --   Quadratus Lumborum -- --   Gluteals -- --   Thoracic Spine normal normal   Lumbar Spine hypomobile hypomobile     Range of Motion: Lumbar  Flexion 80  Repeated Movement   Extension 25  Flex: (-)   Side Bend L: 20 R: 20 Ext: (-)     Range of Motion: Hip  Extension limited limited   Flexion -- --   Internal Rotation limited limited   External Rotation -- --     Strength:   Left Right   Hip Extensors 4/5 4/5   Hip Abductors 4+/5 4+/5   Functional           Other:       Flexibility: Neuroscreen:  Quadriceps  limited Reflexes Sensation   Iliopsoas limited L2-4: 1+ normal   Hamstrings limited S1-2: 1+    Gluteals -- Babinski:--    Triceps Surae limited       Special Testing:   Left Right   Straight Leg Raise (-) (-)   Slump (-) (-)   Femoral Nerve  (-) (-)   Aberrant Movements     Prone Instability Test     TATIANA (+) (+)   JORDYN Curiel (+) (+)         MEDICAL NECESSITY:   · Patient is expected to demonstrate progress in strength and range of motion to increase independence with standing, walking, and ADL's.  REASON FOR SERVICES/OTHER COMMENTS:  · Patient will benefit from skilled PT to address impairments identified through evaluation and return to previous ADL and recreational capacity.    Total Duration:       Rehabilitation Potential For Stated Goals: Arvind Jaramillo PT, DPT

## 2019-05-13 NOTE — PROGRESS NOTES
I am accessing Mr. Ayesha Saenz chart as a part of our department's internal chart auditing process. I certify that Mr. Cory Villarreal is, or was, a patient in our department.   Thank you,  Chyna Boone, PT  5/13/2019

## 2019-05-15 ENCOUNTER — HOSPITAL ENCOUNTER (OUTPATIENT)
Dept: PHYSICAL THERAPY | Age: 74
Discharge: HOME | End: 2019-05-15
Attending: INTERNAL MEDICINE
Payer: MEDICARE

## 2019-05-15 PROCEDURE — 97110 THERAPEUTIC EXERCISES: CPT

## 2019-05-15 PROCEDURE — 97140 MANUAL THERAPY 1/> REGIONS: CPT

## 2019-05-15 NOTE — PROGRESS NOTES
Minoo Velez  : 1945  [place]  Secondary: Darren Suggs at [address]  [phone]   [phone]      OUTPATIENT PHYSICAL THERAPY: Daily Treatment Note 5/15/2019   ICD-10: Treatment Diagnosis: Sciatica, L side [M54.32], Low Back Pain [M54.5]  Precautions/Allergies:   Latex; Adhesive; Efudex [fluorouracil]; Nexium [esomeprazole magnesium]; Paba [p-aminobenzoic acid]; Retin-a [tretinoin]; and Viagra [sildenafil]   MD Orders: Evaluate and Treat MEDICAL/REFERRING DIAGNOSIS:  Sciatica, left side [M54.32]   DATE OF ONSET: 2019  REFERRING PHYSICIAN: Gian Rodgers MD  RETURN PHYSICIAN APPOINTMENT: TBD         Pre-treatment Symptoms/Complaints:  Pt. Notes intensity of pain is reduced and he is better able to self manage symptoms when present. Pain: Initial: Pain Intensity 1: 10 Post Session:  6/10   Medications Last Reviewed:  5/15/2019  Updated Objective Findings:  None Today   TREATMENT:     Therapeutic Exercise: (25 Minutes):  Exercises per grid below to improve mobility and strength. Required moderate verbal and manual cues to promote proper body alignment and promote proper body mechanics. Progressed range as indicated. Date:  5/15/2019   Activity/Exercise Parameters   Thoracic rotation --   Lumbar rotation 2 minutes   Knee side to side --   Piriformis stretch 3 minutes   Quadriceps stretch 5 minutes   Sciatic nerve glide --   Sidelying hip abduction 2 x 10   walking --   Moisés test stretch 4 minutes   Pelvic tilting --   Lumbar flexion --   clams 60 repetitions   Sit to stand 3 x 10   Hip extension --   Quadruped LE extensions --   Lateral walking (orange band) 4 minutes   Manual Therapy (    Soft Tissue Mobilization Duration  Duration: 30 Minutes): Manual techniques to facilitate improved motion and decreased pain.  (Used abbreviations: MET - muscle energy technique; PNF - proprioceptive neuromuscular facilitation; NMR - neuromuscular re-education; a/p - anterior to posterior; p/a - posterior to anterior)   · Soft tissue mobilization: L gluteals, hamstrings and piriformis muscle  · Joint mobilization: posterior glides of L hip in supine grade III  · Joint mobilization: lumbar spine rotation in sidelying grade IV  · Instrument assisted soft tissue mobilization: B lumbar multifidi L5/S1, L gluteals,       MedBridge Portal  Treatment/Session Summary:    · Response to Treatment:  Dry needling techniques reproduce calf pain within session. Pt. toelrates dry needling today with mild pain complaints and minimal post treatment soreness. .  · Communication/Consultation:  None today  · Equipment provided today:  None today  · Recommendations/Intent for next treatment session: Next visit will focus on Flexibility and ROM exercises with increase in LE strengthening.      Treatment Plan of Care Effective Dates:  4/2/19 to 7/1/19  Total Treatment Billable Duration:  55 minutes  PT Patient Time In/Time Out  Time In: 1530  Time Out: Mariana Ingram, PT    Future Appointments   Date Time Provider Miguelito Hawthorne   5/22/2019  1:30 PM Dirk Love PT [facility]   5/24/2019 11:30 AM Dirk Love PT [facility]   5/28/2019  2:30 PM Dirk Love PT [facility]   5/30/2019  2:30 PM Dirk Love PT [facility]   6/19/2019 10:00 AM Sandy Walker MD [facility]   3/25/2020 10:10 AM Tyler aPdgett MD [facility] PGT PGU

## 2019-05-23 ENCOUNTER — APPOINTMENT (OUTPATIENT)
Dept: PHYSICAL THERAPY | Age: 74
End: 2019-05-23
Attending: INTERNAL MEDICINE
Payer: MEDICARE

## 2019-05-24 ENCOUNTER — APPOINTMENT (OUTPATIENT)
Dept: PHYSICAL THERAPY | Age: 74
End: 2019-05-24
Attending: INTERNAL MEDICINE
Payer: MEDICARE

## 2019-05-28 ENCOUNTER — HOSPITAL ENCOUNTER (OUTPATIENT)
Dept: PHYSICAL THERAPY | Age: 74
Discharge: HOME | End: 2019-05-28
Attending: INTERNAL MEDICINE
Payer: MEDICARE

## 2019-05-28 PROCEDURE — 97140 MANUAL THERAPY 1/> REGIONS: CPT

## 2019-05-28 PROCEDURE — 97110 THERAPEUTIC EXERCISES: CPT

## 2019-05-28 NOTE — PROGRESS NOTES
Miguel Tyson  : 1945  [place]  Secondary: Darren Suggs at [address]  [phone]   [phone]      OUTPATIENT PHYSICAL THERAPY: Daily Treatment Note 2019   ICD-10: Treatment Diagnosis: Sciatica, L side [M54.32], Low Back Pain [M54.5]  Precautions/Allergies:   Latex; Adhesive; Efudex [fluorouracil]; Nexium [esomeprazole magnesium]; Paba [p-aminobenzoic acid]; Retin-a [tretinoin]; and Viagra [sildenafil]   MD Orders: Evaluate and Treat MEDICAL/REFERRING DIAGNOSIS:  Sciatica, left side [M54.32]   DATE OF ONSET: 2019  REFERRING PHYSICIAN: Poncho Macedo MD  RETURN PHYSICIAN APPOINTMENT: TBD         Pre-treatment Symptoms/Complaints:  Pt. Reports injuring his L leg with a contusion last week resulting in absence of PT. Overall, symptoms have been improved. Pt. Notes yesterday he experienced pain for most of the day however. Pain: Initial: Pain Intensity 1: 6 /10 Post Session:  10   Medications Last Reviewed:  2019  Updated Objective Findings:  mild contusion on L leg remaining. TREATMENT:     Therapeutic Exercise: (30 Minutes):  Exercises per grid below to improve mobility and strength. Required moderate verbal and manual cues to promote proper body alignment and promote proper body mechanics. Progressed range as indicated.      Date:  2019   Activity/Exercise Parameters   Thoracic rotation --   Lumbar rotation 2 minutes   Knee side to side --   Piriformis stretch 3 minutes   Quadriceps stretch 5 minutes   Sciatic nerve glide 3 x 10   Sidelying hip abduction 3 x 10   walking 5 mniutew   Moisés test stretch 4 minutes   Pelvic tilting --   Lumbar flexion --   clams 60 repetitions   Sit to stand 3 x 10   Hip extension --   Quadruped LE extensions --   Lateral walking (orange band) --   Manual Therapy (    Soft Tissue Mobilization Duration  Duration: 25 Minutes): Manual techniques to facilitate improved motion and decreased pain. (Used abbreviations: MET - muscle energy technique; PNF - proprioceptive neuromuscular facilitation; NMR - neuromuscular re-education; a/p - anterior to posterior; p/a - posterior to anterior)   · Soft tissue mobilization: L gluteals, hamstrings and piriformis muscle  · Joint mobilization: posterior glides of L hip in supine grade III  · Joint mobilization: lumbar spine rotation in sidelying grade IV  · Instrument assisted soft tissue mobilization: B lumbar multifidi L5/S1, L gluteals, (NOT PERFORMED)      MedBridge Portal  Treatment/Session Summary:    · Response to Treatment:  Pt. tolerates lower quarter strengthening exercises today with improved endurance. minimal pain is present with walking today. .  · Communication/Consultation:  None today  · Equipment provided today:  None today  · Recommendations/Intent for next treatment session: Next visit will focus on Flexibility and ROM exercises with increase in LE strengthening.      Treatment Plan of Care Effective Dates:  4/2/19 to 7/1/19  Total Treatment Billable Duration:  55 minutes  PT Patient Time In/Time Out  Time In: 1430  Time Out: 315 Jane Parker, PT    Future Appointments   Date Time Provider Miguelito Hawthorne   5/30/2019  2:30 PM Batsheva Carrizales [facility]   6/3/2019  4:30 PM Keke Jerez PT [facility]   6/5/2019 11:30 AM Keke Jerez PT [facility]   6/19/2019 10:00 AM Yessi Murray MD [facility] [place]   3/25/2020 10:10 AM Venu Law MD [facility] PGT PGU

## 2019-05-30 ENCOUNTER — HOSPITAL ENCOUNTER (OUTPATIENT)
Dept: PHYSICAL THERAPY | Age: 74
Discharge: HOME | End: 2019-05-30
Attending: INTERNAL MEDICINE
Payer: MEDICARE

## 2019-05-30 PROCEDURE — 97110 THERAPEUTIC EXERCISES: CPT

## 2019-05-30 PROCEDURE — 97140 MANUAL THERAPY 1/> REGIONS: CPT

## 2019-05-30 NOTE — PROGRESS NOTES
Saima Farias  : 1945  [place]  Secondary: Darren Suggs at [address]  [phone]   [phone]      OUTPATIENT PHYSICAL THERAPY: Daily Treatment Note 2019   ICD-10: Treatment Diagnosis: Sciatica, L side [M54.32], Low Back Pain [M54.5]  Precautions/Allergies:   Latex; Adhesive; Efudex [fluorouracil]; Nexium [esomeprazole magnesium]; Paba [p-aminobenzoic acid]; Retin-a [tretinoin]; and Viagra [sildenafil]   MD Orders: Evaluate and Treat MEDICAL/REFERRING DIAGNOSIS:  Sciatica, left side [M54.32]   DATE OF ONSET: 2019  REFERRING PHYSICIAN: Gunnar Huerta MD  RETURN PHYSICIAN APPOINTMENT: TBD         Pre-treatment Symptoms/Complaints:  Pt. Notes pain in the AM upon rising but mostly tolerable L LE pain this week. Pain: Initial: Pain Intensity 1:  /10 Post Session:  5/10   Medications Last Reviewed:  2019  Updated Objective Findings:  None Today   TREATMENT:     Therapeutic Exercise: (25 Minutes):  Exercises per grid below to improve mobility and strength. Required moderate verbal and manual cues to promote proper body alignment and promote proper body mechanics. Progressed range as indicated. Date:  2019   Activity/Exercise Parameters   Thoracic rotation --   Lumbar rotation 2 minutes   Knee side to side 2 minutes   Piriformis stretch 3 minutes   Quadriceps stretch 5 minutes   Sciatic nerve glide 3 x 10   Sidelying hip abduction --   walking --   Moisés test stretch 4 minutes   Pelvic tilting --   Lumbar flexion --   clams --   Sit to stand 3 x 10   Hip extension --   Quadruped LE extensions --   Lateral walking (orange band) --   Manual Therapy (    Soft Tissue Mobilization Duration  Duration: 30 Minutes): Manual techniques to facilitate improved motion and decreased pain.  (Used abbreviations: MET - muscle energy technique; PNF - proprioceptive neuromuscular facilitation; NMR - neuromuscular re-education; a/p - anterior to posterior; p/a - posterior to anterior)   · Soft tissue mobilization: L gluteals, hamstrings and piriformis muscle  · Joint mobilization: posterior glides of L hip in supine grade III  · Joint mobilization: lumbar spine rotation in sidelying grade IV  · Instrument assisted soft tissue mobilization: B lumbar multifidi L5/S1, L gluteals      MedBridge Portal  Treatment/Session Summary:    · Response to Treatment:  Dry needling to the L gluteals reproduce L LE pain today. Pt. tolerates dry needling without complaints and minimal post treatment soreness. .  · Communication/Consultation:  None today  · Equipment provided today:  None today  · Recommendations/Intent for next treatment session: Next visit will focus on Flexibility and ROM exercises with increase in LE strengthening.      Treatment Plan of Care Effective Dates:  4/2/19 to 7/1/19  Total Treatment Billable Duration:  55 minutes  PT Patient Time In/Time Out  Time In: 1430  Time Out: 315 Jane Parker, PT    Future Appointments   Date Time Provider Miguelito Hawthorne   6/3/2019  4:30 PM Lashawn Ramires PT OFF [facility]   6/5/2019 11:30 AM Lashawn Ramires PT SFOFF [facility]   6/19/2019 10:00 AM Evgeny Mascorro MD [facility]   3/25/2020 10:10 AM Anil Jaramillo MD [facility] PGT PGU

## 2019-06-03 ENCOUNTER — HOSPITAL ENCOUNTER (OUTPATIENT)
Dept: PHYSICAL THERAPY | Age: 74
Discharge: HOME | End: 2019-06-03
Attending: INTERNAL MEDICINE
Payer: MEDICARE

## 2019-06-03 PROCEDURE — 97140 MANUAL THERAPY 1/> REGIONS: CPT

## 2019-06-03 PROCEDURE — 97110 THERAPEUTIC EXERCISES: CPT

## 2019-06-03 NOTE — PROGRESS NOTES
Ernestine Zee  : 1945  [place]  Secondary: Darren Suggs at [address]  [phone]   [phone]      OUTPATIENT PHYSICAL THERAPY: Daily Treatment Note 6/3/2019   ICD-10: Treatment Diagnosis: Sciatica, L side [M54.32], Low Back Pain [M54.5]  Precautions/Allergies:   Latex; Adhesive; Efudex [fluorouracil]; Nexium [esomeprazole magnesium]; Paba [p-aminobenzoic acid]; Retin-a [tretinoin]; and Viagra [sildenafil]   MD Orders: Evaluate and Treat MEDICAL/REFERRING DIAGNOSIS:  Sciatica, left side [M54.32]   DATE OF ONSET: 2019  REFERRING PHYSICIAN: Buzz Pereira MD  RETURN PHYSICIAN APPOINTMENT: TBD         Pre-treatment Symptoms/Complaints:  Pt. Reports return of symptoms over the weekend. Pt. Notes improvements are slow. Pain: Initial: Pain Intensity 1: 10 Post Session:  5/10   Medications Last Reviewed:  6/3/2019  Updated Objective Findings:  None Today   TREATMENT:     Therapeutic Exercise: (30 Minutes):  Exercises per grid below to improve mobility and strength. Required moderate verbal and manual cues to promote proper body alignment and promote proper body mechanics. Progressed range as indicated. Date:  6/3/2019   Activity/Exercise Parameters   Thoracic rotation --   Lumbar rotation 2 minutes   Knee side to side 2 minutes   Piriformis stretch 3 minutes   Quadriceps stretch 5 minutes   Sciatic nerve glide 3 x 10   Sidelying hip abduction 3 x 10   walking --   Moisés test stretch --   Pelvic tilting --   bridges 3 x 10   clams 3 x 15   Sit to stand 3 x 10   Hip extension --   Quadruped LE extensions 3 x 20   Lateral walking (orange band) --   Manual Therapy (    Soft Tissue Mobilization Duration  Duration: 25 Minutes): Manual techniques to facilitate improved motion and decreased pain.  (Used abbreviations: MET - muscle energy technique; PNF - proprioceptive neuromuscular facilitation; NMR - neuromuscular re-education; a/p - anterior to posterior; p/a - posterior to anterior)   · Soft tissue mobilization: L gluteals, hamstrings and piriformis muscle  · Joint mobilization: posterior glides of L hip in supine grade III  · Joint mobilization: lumbar spine rotation in sidelying grade IV  · Instrument assisted soft tissue mobilization: B lumbar multifidi L5/S1, L gluteals (NOT PERFORMED)      MedBridge Portal  Treatment/Session Summary:    · Response to Treatment:  Pt. tolerates manual therapy and strengthening exercises today without complaints. PT will assess symptoms later this week and consider d/c with f/u to referring physician if pain remains. .  · Communication/Consultation:  None today  · Equipment provided today:  None today  · Recommendations/Intent for next treatment session: Next visit will focus on Flexibility and ROM exercises with increase in LE strengthening.      Treatment Plan of Care Effective Dates:  4/2/19 to 7/1/19  Total Treatment Billable Duration:  55 minutes  PT Patient Time In/Time Out  Time In: 1630  Time Out: 13[address] Her[address] Lamb, PT    Future Appointments   Date Time Provider Miguelito Hawthorne   6/5/2019 11:30 AM Librado Bailon PT [facility]   6/19/2019 10:00 AM Margarito Lubin MD [facility]   3/25/2020 10:10 AM Franc Rocha MD [facility] PGT PGU

## 2019-06-05 ENCOUNTER — HOSPITAL ENCOUNTER (OUTPATIENT)
Dept: PHYSICAL THERAPY | Age: 74
Discharge: HOME | End: 2019-06-05
Attending: INTERNAL MEDICINE
Payer: MEDICARE

## 2019-06-05 PROCEDURE — 97140 MANUAL THERAPY 1/> REGIONS: CPT

## 2019-06-05 PROCEDURE — 97110 THERAPEUTIC EXERCISES: CPT

## 2019-06-05 NOTE — THERAPY DISCHARGE
Iris Cost  : 1945  [place]  Secondary: Darren Suggs at [address]  [phone]   [phone]        OUTPATIENT PHYSICAL THERAPY:Discharge Summary 2019   ICD-10: Treatment Diagnosis: Sciatica, L side [M54.32], Low Back Pain [M54.5]  Precautions/Allergies:   Latex; Adhesive; Efudex [fluorouracil]; Nexium [esomeprazole magnesium]; Paba [p-aminobenzoic acid]; Retin-a [tretinoin]; and Viagra [sildenafil]   MD Orders: Evaluate and Treat MEDICAL/REFERRING DIAGNOSIS:  Sciatica, left side [M54.32]   DATE OF ONSET: 2019  REFERRING PHYSICIAN: Gemma Waldrop MD  RETURN PHYSICIAN APPOINTMENT: TBD     INITIAL ASSESSMENT:  Mr. Ubaldo Boyd presents with signs and symptoms of L LE sciatica. Source of L LE sciatica is undetermined at this time. Lumbar testing is (-) with absence of reproduction of symptoms with repeated lumbar flexion or extension, absence of reproduction with central p/a to the lumbar spine, (-) slump testing, and negative SLR testing. SI joint provocation is negative additionally. Pt. Does not demonstrates tenderness along the piriformis, but does present with flexibility limitations in the L hip external rotators. Other finding include general flexibility and ROM deficits in the hip and spine. Pt. Will benefit from a flexibility/mobility program to address pain and improve functional walking endurance without pain. 19 Progress Report: Pt. Attends 9 physical therapy visits. Pt. Continues to experience radicular L LE symptoms with prolonged standing and walking activities. Despite remaining pain, pt. Is able to perform prescribed exercises to reduce symptoms when present. Pt. Does experience short term relief of symptoms following PT session. Pt. Achieves significant improvements on the RAYMOND since starting PT.   Pt. Will benefit from continued skilled PT to address remaining pain and ADL deficits. 6/5/19 Discharge Summary:  Pt. Attends 14 physical therapy visits. Pt. Makes good progress toward set goals but symptoms remain persistent with standing and walking activities. Pt. Continues to experience difficulty with arising after sleep and ADL/recreational activities. Pt. Will f/u with referring physician for further alternative treatment. Pt. Is advised to continue HEP and prognosis remains good. Pt. Is to discharge at this time. PROBLEM LIST (Impacting functional limitations):  1. Decreased Strength  2. Decreased ADL/Functional Activities  3. Increased Pain  4. Decreased Flexibility/Joint Mobility  5. Decreased [place] with Home Exercise Program INTERVENTIONS PLANNED: (Treatment may consist of any combination of the following)  1. Home Exercise Program (HEP)  2. Manual Therapy  3. Range of Motion (ROM)  4. Therapeutic Exercise/Strengthening     GOALS: (Goals have been discussed and agreed upon with patient.)  Discharge Goals: Time Frame: 8 weeks  1. Pt. Will walk for >15 minutes with 0/10 L LE pain to improve walking capacity. NOT MET  2. Pt. Will be independent with HEP to improve pain and prevent return of symptoms. MET  3. Pt. Will score <20% on the RAYMOND to demonstrate improved pain and function. MET    OUTCOME MEASURE:   Tool Used: Modified Oswestry Low Back Pain Questionnaire  Score:  Initial: 13/50  Most Recent: 7/50  (Date: 5/9/19 )   Interpretation of Score: Each section is scored on a 0-5 scale, 5 representing the greatest disability. The scores of each section are added together for a total score of 50.       UPDATED OBJECTIVE FINDINGS:    Palpation/Joint Mobility:   Left Right   Lumbar Paraspinals -- --   Quadratus Lumborum -- --   Gluteals -- --   Thoracic Spine normal normal   Lumbar Spine hypomobile hypomobile     Range of Motion: Lumbar  Flexion 80  Repeated Movement   Extension 25  Flex: (-)   Side Bend L: 20 R: 20 Ext: (-) Range of Motion: Hip  Extension limited limited   Flexion -- --   Internal Rotation limited limited   External Rotation -- --     Strength:   Left Right   Hip Extensors 4+/5 4+5   Hip Abductors 4+/5 4+/5   Functional           Other:       Flexibility:                                                                             Neuroscreen:  Quadriceps  limited Reflexes Sensation   Iliopsoas limited L2-4: 1+ normal   Hamstrings limited S1-2: 1+    Gluteals -- Babinski:--    Triceps Surae limited       Special Testing:   Left Right   Straight Leg Raise (-) (-)   Slump (-) (-)   Femoral Nerve  (-) (-)   Aberrant Movements     Prone Instability Test     TATIANA (+) (+)   JORDYN Curiel (+) (+)         Total Duration:  PT Patient Time In/Time Out  Time In: 1130  Time Out: 602 Ruth Rogers PT, DPT

## 2019-06-05 NOTE — PROGRESS NOTES
Nuria Alfred  : 1945  [place]  Secondary: Darren Suggs at [address]  [phone]   [phone]      OUTPATIENT PHYSICAL THERAPY: Daily Treatment Note 2019   ICD-10: Treatment Diagnosis: Sciatica, L side [M54.32], Low Back Pain [M54.5]  Precautions/Allergies:   Latex; Adhesive; Efudex [fluorouracil]; Nexium [esomeprazole magnesium]; Paba [p-aminobenzoic acid]; Retin-a [tretinoin]; and Viagra [sildenafil]   MD Orders: Evaluate and Treat MEDICAL/REFERRING DIAGNOSIS:  Sciatica, left side [M54.32]   DATE OF ONSET: 2019  REFERRING PHYSICIAN: Carisa Lara MD  RETURN PHYSICIAN APPOINTMENT: TBD         Pre-treatment Symptoms/Complaints:  Pt. Notes symptoms remain persistent. Pt. Notes he will f/u with referring doctor this month and continue HEP      Pain: Initial: Pain Intensity 1: 6 /10 Post Session:  5/10   Medications Last Reviewed:  2019  Updated Objective Findings:  See evaluation note from today   TREATMENT:     Therapeutic Exercise: (40 Minutes):  Exercises per grid below to improve mobility and strength. Required moderate verbal and manual cues to promote proper body alignment and promote proper body mechanics. Progressed range as indicated. Date:  2019   Activity/Exercise Parameters   Thoracic rotation --   Lumbar rotation 2 minutes   Knee side to side 2 minutes   Piriformis stretch 3 minutes   Quadriceps stretch 5 minutes   Sciatic nerve glide 3 x 10   Sidelying hip abduction 3 x 10   walking --   Moisés test stretch --   Pelvic tilting --   bridges 3 x 10   clams 3 x 15   Sit to stand 3 x 10   Hip extension --   Quadruped LE extensions 3 x 20   Lateral walking (orange band) --   Manual Therapy (    Soft Tissue Mobilization Duration  Duration: 15 Minutes): Manual techniques to facilitate improved motion and decreased pain.  (Used abbreviations: MET - muscle energy technique; PNF - proprioceptive neuromuscular facilitation; NMR - neuromuscular re-education; a/p - anterior to posterior; p/a - posterior to anterior)   · Soft tissue mobilization: L gluteals, hamstrings and piriformis muscle  · Joint mobilization: posterior glides of L hip in supine grade III  · Joint mobilization: lumbar spine rotation in sidelying grade IV  · Instrument assisted soft tissue mobilization: B lumbar multifidi L5/S1, L gluteals (NOT PERFORMED)      MedBridge Portal  Treatment/Session Summary:    · Response to Treatment:  Pt. is educated with full HEP today. Pt. tolerates flexibility and strengthening exercises without complaints.   Pt. will discharge at this time an f/u with MD.    Treatment Plan of Care Effective Dates:  4/2/19 to 7/1/19  Total Treatment Billable Duration:  55 minutes  PT Patient Time In/Time Out  Time In: 1130  Time Out: [address], PT    Future Appointments   Date Time Provider Miguelito Hawthorne   6/19/2019 10:00 AM Barrington Solano MD [facility]   3/25/2020 10:10 AM Randal Barkley MD [facility] PGUHT PGU

## 2019-06-19 ENCOUNTER — HOSPITAL ENCOUNTER (OUTPATIENT)
Dept: MRI IMAGING | Age: 74
Discharge: HOME | End: 2019-06-19
Attending: INTERNAL MEDICINE
Payer: MEDICARE

## 2019-06-19 DIAGNOSIS — G89.29 CHRONIC LEFT-SIDED LOW BACK PAIN WITH LEFT-SIDED SCIATICA: ICD-10-CM

## 2019-06-19 DIAGNOSIS — M54.42 CHRONIC LEFT-SIDED LOW BACK PAIN WITH LEFT-SIDED SCIATICA: ICD-10-CM

## 2019-06-19 PROCEDURE — 72148 MRI LUMBAR SPINE W/O DYE: CPT

## 2019-06-28 NOTE — PROGRESS NOTES
Patient notified of MRI results and already has an appt with P[facility]-Dr Kaykay Aguirre for 7/9/TD

## 2019-09-20 PROBLEM — Z13.6 SCREENING FOR AAA (ABDOMINAL AORTIC ANEURYSM): Status: RESOLVED | Noted: 2019-03-22 | Resolved: 2019-09-20

## 2019-10-23 PROBLEM — Z00.00 ROUTINE GENERAL MEDICAL EXAMINATION AT A HEALTH CARE FACILITY: Status: RESOLVED | Noted: 2017-08-30 | Resolved: 2019-10-23

## 2019-10-23 PROBLEM — Z23 ENCOUNTER FOR IMMUNIZATION: Status: RESOLVED | Noted: 2018-09-14 | Resolved: 2019-10-23

## 2019-11-01 PROBLEM — Z86.69 HX OF SCIATICA: Status: ACTIVE | Noted: 2019-11-01

## 2020-01-11 ENCOUNTER — HOSPITAL ENCOUNTER (OUTPATIENT)
Dept: MRI IMAGING | Age: 75
Discharge: HOME | End: 2020-01-11
Attending: UROLOGY
Payer: MEDICARE

## 2020-01-11 DIAGNOSIS — R97.20 ELEVATED PSA: ICD-10-CM

## 2020-01-11 PROCEDURE — 72197 MRI PELVIS W/O & W/DYE: CPT

## 2020-01-11 PROCEDURE — 74011250636 HC RX REV CODE- 250/636: Performed by: UROLOGY

## 2020-01-11 PROCEDURE — A9575 INJ GADOTERATE MEGLUMI 0.1ML: HCPCS | Performed by: UROLOGY

## 2020-01-11 RX ORDER — GADOTERATE MEGLUMINE 376.9 MG/ML
20 INJECTION INTRAVENOUS
Status: COMPLETED | OUTPATIENT
Start: 2020-01-11 | End: 2020-01-11

## 2020-01-11 RX ORDER — SODIUM CHLORIDE 0.9 % (FLUSH) 0.9 %
10 SYRINGE (ML) INJECTION
Status: COMPLETED | OUTPATIENT
Start: 2020-01-11 | End: 2020-01-11

## 2020-01-11 RX ADMIN — GADOTERATE MEGLUMINE 20 ML: 376.9 INJECTION INTRAVENOUS at 10:39

## 2020-01-11 RX ADMIN — Medication 10 ML: at 10:39

## 2020-01-16 RX ORDER — CEFTRIAXONE 1 G/1
1 INJECTION, POWDER, FOR SOLUTION INTRAMUSCULAR; INTRAVENOUS ONCE
Status: CANCELLED | OUTPATIENT
Start: 2020-01-16 | End: 2020-01-16

## 2020-02-12 ENCOUNTER — ANESTHESIA EVENT (OUTPATIENT)
Dept: SURGERY | Age: 75
End: 2020-02-12
Payer: MEDICARE

## 2020-02-13 ENCOUNTER — HOSPITAL ENCOUNTER (OUTPATIENT)
Age: 75
Setting detail: OUTPATIENT SURGERY
Discharge: HOME | End: 2020-02-13
Attending: UROLOGY | Admitting: UROLOGY
Payer: MEDICARE

## 2020-02-13 ENCOUNTER — ANESTHESIA (OUTPATIENT)
Dept: SURGERY | Age: 75
End: 2020-02-13
Payer: MEDICARE

## 2020-02-13 VITALS
TEMPERATURE: 97.7 F | SYSTOLIC BLOOD PRESSURE: 113 MMHG | OXYGEN SATURATION: 96 % | RESPIRATION RATE: 16 BRPM | DIASTOLIC BLOOD PRESSURE: 75 MMHG | HEIGHT: 67 IN | BODY MASS INDEX: 29.03 KG/M2 | WEIGHT: 185 LBS | HEART RATE: 49 BPM

## 2020-02-13 LAB
APPEARANCE UR: CLEAR
BILIRUB UR QL: NEGATIVE
COLOR UR: YELLOW
GLUCOSE UR STRIP.AUTO-MCNC: NEGATIVE MG/DL
HGB UR QL STRIP: NEGATIVE
KETONES UR QL STRIP.AUTO: NEGATIVE MG/DL
LEUKOCYTE ESTERASE UR QL STRIP.AUTO: NEGATIVE
NITRITE UR QL STRIP.AUTO: NEGATIVE
PH UR STRIP: 7 [PH] (ref 5–9)
POTASSIUM BLD-SCNC: 3.8 MMOL/L (ref 3.5–5.1)
PROT UR STRIP-MCNC: NEGATIVE MG/DL
SP GR UR REFRACTOMETRY: 1.01 (ref 1–1.02)
UROBILINOGEN UR QL STRIP.AUTO: 0.2 EU/DL (ref 0.2–1)

## 2020-02-13 PROCEDURE — 88305 TISSUE EXAM BY PATHOLOGIST: CPT

## 2020-02-13 PROCEDURE — 76060000032 HC ANESTHESIA 0.5 TO 1 HR: Performed by: UROLOGY

## 2020-02-13 PROCEDURE — 81003 URINALYSIS AUTO W/O SCOPE: CPT

## 2020-02-13 PROCEDURE — 74011250636 HC RX REV CODE- 250/636: Performed by: ANESTHESIOLOGY

## 2020-02-13 PROCEDURE — 74011000250 HC RX REV CODE- 250: Performed by: NURSE ANESTHETIST, CERTIFIED REGISTERED

## 2020-02-13 PROCEDURE — 76010000138 HC OR TIME 0.5 TO 1 HR: Performed by: UROLOGY

## 2020-02-13 PROCEDURE — 74011250636 HC RX REV CODE- 250/636: Performed by: UROLOGY

## 2020-02-13 PROCEDURE — 74011250637 HC RX REV CODE- 250/637: Performed by: ANESTHESIOLOGY

## 2020-02-13 PROCEDURE — 76210000021 HC REC RM PH II 0.5 TO 1 HR: Performed by: UROLOGY

## 2020-02-13 PROCEDURE — 74011250636 HC RX REV CODE- 250/636: Performed by: NURSE ANESTHETIST, CERTIFIED REGISTERED

## 2020-02-13 PROCEDURE — 74011000258 HC RX REV CODE- 258: Performed by: UROLOGY

## 2020-02-13 PROCEDURE — 77030003481 HC NDL BIOP GUN BARD -B: Performed by: UROLOGY

## 2020-02-13 PROCEDURE — 88344 IMHCHEM/IMCYTCHM EA MLT ANTB: CPT

## 2020-02-13 PROCEDURE — 76210000063 HC OR PH I REC FIRST 0.5 HR: Performed by: UROLOGY

## 2020-02-13 PROCEDURE — 84132 ASSAY OF SERUM POTASSIUM: CPT

## 2020-02-13 RX ORDER — PROPOFOL 10 MG/ML
INJECTION, EMULSION INTRAVENOUS
Status: DISCONTINUED | OUTPATIENT
Start: 2020-02-13 | End: 2020-02-13 | Stop reason: HOSPADM

## 2020-02-13 RX ORDER — LIDOCAINE HYDROCHLORIDE 10 MG/ML
0.1 INJECTION INFILTRATION; PERINEURAL AS NEEDED
Status: DISCONTINUED | OUTPATIENT
Start: 2020-02-13 | End: 2020-02-13 | Stop reason: HOSPADM

## 2020-02-13 RX ORDER — HYDROMORPHONE HYDROCHLORIDE 2 MG/ML
0.5 INJECTION, SOLUTION INTRAMUSCULAR; INTRAVENOUS; SUBCUTANEOUS
Status: DISCONTINUED | OUTPATIENT
Start: 2020-02-13 | End: 2020-02-13 | Stop reason: HOSPADM

## 2020-02-13 RX ORDER — SODIUM CHLORIDE, SODIUM LACTATE, POTASSIUM CHLORIDE, CALCIUM CHLORIDE 600; 310; 30; 20 MG/100ML; MG/100ML; MG/100ML; MG/100ML
125 INJECTION, SOLUTION INTRAVENOUS CONTINUOUS
Status: DISCONTINUED | OUTPATIENT
Start: 2020-02-13 | End: 2020-02-13 | Stop reason: HOSPADM

## 2020-02-13 RX ORDER — AMLODIPINE BESYLATE 10 MG/1
10 TABLET ORAL
Status: COMPLETED | OUTPATIENT
Start: 2020-02-13 | End: 2020-02-13

## 2020-02-13 RX ORDER — PROPOFOL 10 MG/ML
INJECTION, EMULSION INTRAVENOUS AS NEEDED
Status: DISCONTINUED | OUTPATIENT
Start: 2020-02-13 | End: 2020-02-13 | Stop reason: HOSPADM

## 2020-02-13 RX ORDER — OXYCODONE HYDROCHLORIDE 5 MG/1
5 TABLET ORAL
Status: COMPLETED | OUTPATIENT
Start: 2020-02-13 | End: 2020-02-13

## 2020-02-13 RX ORDER — NALOXONE HYDROCHLORIDE 0.4 MG/ML
0.1 INJECTION, SOLUTION INTRAMUSCULAR; INTRAVENOUS; SUBCUTANEOUS AS NEEDED
Status: DISCONTINUED | OUTPATIENT
Start: 2020-02-13 | End: 2020-02-13 | Stop reason: HOSPADM

## 2020-02-13 RX ORDER — ACETAMINOPHEN 500 MG
1000 TABLET ORAL ONCE
Status: COMPLETED | OUTPATIENT
Start: 2020-02-13 | End: 2020-02-13

## 2020-02-13 RX ORDER — LIDOCAINE HYDROCHLORIDE 20 MG/ML
INJECTION, SOLUTION EPIDURAL; INFILTRATION; INTRACAUDAL; PERINEURAL AS NEEDED
Status: DISCONTINUED | OUTPATIENT
Start: 2020-02-13 | End: 2020-02-13 | Stop reason: HOSPADM

## 2020-02-13 RX ADMIN — SODIUM CHLORIDE, SODIUM LACTATE, POTASSIUM CHLORIDE, AND CALCIUM CHLORIDE 125 ML/HR: 600; 310; 30; 20 INJECTION, SOLUTION INTRAVENOUS at 10:03

## 2020-02-13 RX ADMIN — AMLODIPINE BESYLATE 10 MG: 10 TABLET ORAL at 09:44

## 2020-02-13 RX ADMIN — PROPOFOL 140 MCG/KG/MIN: 10 INJECTION, EMULSION INTRAVENOUS at 12:59

## 2020-02-13 RX ADMIN — SODIUM CHLORIDE, SODIUM LACTATE, POTASSIUM CHLORIDE, AND CALCIUM CHLORIDE: 600; 310; 30; 20 INJECTION, SOLUTION INTRAVENOUS at 12:52

## 2020-02-13 RX ADMIN — OXYCODONE 5 MG: 5 TABLET ORAL at 14:03

## 2020-02-13 RX ADMIN — PROPOFOL 50 MG: 10 INJECTION, EMULSION INTRAVENOUS at 12:59

## 2020-02-13 RX ADMIN — CEFTRIAXONE 1 G: 1 INJECTION, POWDER, FOR SOLUTION INTRAMUSCULAR; INTRAVENOUS at 12:52

## 2020-02-13 RX ADMIN — ACETAMINOPHEN 1000 MG: 500 TABLET, FILM COATED ORAL at 09:44

## 2020-02-13 RX ADMIN — LIDOCAINE HYDROCHLORIDE 60 MG: 20 INJECTION, SOLUTION EPIDURAL; INFILTRATION; INTRACAUDAL; PERINEURAL at 12:59

## 2020-02-13 NOTE — ANESTHESIA POSTPROCEDURE EVALUATION
Procedure(s):  MRI FUISION GUIDED PROSTATE BIOPSY. total IV anesthesia    Anesthesia Post Evaluation        Patient location during evaluation: PACU  Patient participation: complete - patient participated  Level of consciousness: responsive to verbal stimuli  Pain management: adequate  Airway patency: patent  Anesthetic complications: no  Cardiovascular status: acceptable  Respiratory status: acceptable  Hydration status: euvolemic        Vitals Value Taken Time   /59 2/13/2020  1:49 PM   Temp 36.4 °C (97.6 °F) 2/13/2020  1:28 PM   Pulse 51 2/13/2020  1:49 PM   Resp 16 2/13/2020  1:40 PM   SpO2 95 % 2/13/2020  1:49 PM   Vitals shown include unvalidated device data.

## 2020-02-13 NOTE — OP NOTES
Operative Note                Patient: Helen Rodriguez [id number]    Date of Surgery: 02/13/20    Preoperative Diagnosis: elevated PSA    Postoperative Diagnosis:  same    Surgeon(s) and Role:     * Eva Madrigal MD - Primary     Anesthesia:  IV sedation     Procedure: Procedure(s):  Oscarinio Yocasta MRI fused TRUS prostate biopsy     RISKS DISCUSSION:     Discussed the risk of surgery including infection, hematoma, bleeding, and the risks of general anesthetic. The patient understands the risks, any and all questions were answered to the patient's satisfaction and they freely signed the consent for operation. URONAV MRI FUSED TRANSRECTAL ULTRASOUND GUIDED BIOPSY OF THE PROSTATE    All risks, benefits and alternatives were again reviewed and he is willing to proceed at this time. The patient was placed in the left lateral decubitus position. I then inserted the transrectal ultrasound probe into the rectum. The prostate was visualized. The prostate appeared homogenous in appearance. Ultrasonographic sweep of the prostate was performed to obtain images to link to MRI via URONAV. There were 1 regions of interest based upon MRI imaging. 4 biopsies of regions of interest were then obtained using the ultrasound images for guidance that had been linked to the previous MRI using Uronav. I then performed 12 needle core biopsies using a standard sextant biopsy format with traditional ultrasound images for guidance. The ultrasound probe was removed. The patient tolerated the procedure well.       PROSTATE VOLUME:  68 gm     PSAD 0.18    Estimated Blood Loss:  minimal    Specimens: prostate biopsies             Drains: none                 Implants: * No implants in log *           Signed By: Eva Madrigal MD

## 2020-02-13 NOTE — BRIEF OP NOTE
BRIEF OPERATIVE NOTE    Date of Procedure: 2/13/2020   Preoperative Diagnosis: Elevated PSA [R97.20]  Postoperative Diagnosis: Elevated PSA [R97.20]    Procedure(s):  MRI FUISION GUIDED PROSTATE BIOPSY  Surgeon(s) and Role:     * Princess Jay MD - Primary         Surgical Assistant: none    Surgical Staff:  Circ-1: Elvira Machuca  Scrub Tech-1: Charon Bloch D  Event Time In Time Out   Incision Start 1307    Incision Close 1319      Anesthesia: MAC   Estimated Blood Loss: minimal  Specimens:   ID Type Source Tests Collected by Time Destination   1 : JANINE #1 Right [place] Preservative Prostate  Princess Jay MD 2/13/2020 0171 Pathology   2 : RA Preservative Prostate  Princess Jay MD 2/13/2020 1496 Pathology   3 : RLA Preservative Prostate  Princess Jay MD 2/13/2020 1314 Pathology   4 : RM Preservative Prostate  Princess Jay MD 2/13/2020 1315 Pathology   5 : [facility] YO Bartlettervative Prostate  Princess Jay MD 2/13/2020 1315 Pathology   6 : RB Preservative Prostate  Princess Jay MD 2/13/2020 1315 Pathology   7 : RLB Preservative Prostate  Princess Jay MD 2/13/2020 1316 Pathology   8 : LA Preservative Cherrie Thompson MD 2/13/2020 1316 Pathology   9 : LLA Preservative Cherrie Thompson MD 2/13/2020 1317 Pathology   10 : LM Preservative Cherrie Thompson MD 2/13/2020 1317 Pathology   11 : LLSONDRA Jungative Cherrie Thompson MD 2/13/2020 1317 Pathology   12 : LB Preservative Prostate  Princess Jay MD 2/13/2020 1318 Pathology   13 : Alex Camp MD 2/13/2020 1318 Pathology      Findings: see op note   Complications: none  Implants: * No implants in log *

## 2020-02-13 NOTE — ANESTHESIA PREPROCEDURE EVALUATION
Relevant Problems   No relevant active problems       Anesthetic History               Review of Systems / Medical History  Patient summary reviewed, nursing notes reviewed and pertinent labs reviewed    Pulmonary        Sleep apnea (mild)           Neuro/Psych             Comments: Essential tremor Cardiovascular    Hypertension          Hyperlipidemia    Exercise tolerance: >4 METS  Comments: TTE March 2019:  LVEF 60%.   Mild-moderate AR, MR   GI/Hepatic/Renal     GERD: well controlled    Renal disease: stones       Endo/Other             Other Findings            Physical Exam    Airway  Mallampati: II  TM Distance: > 6 cm  Neck ROM: decreased range of motion   Mouth opening: Normal     Cardiovascular  Regular rate and rhythm,  S1 and S2 normal,  no murmur, click, rub, or gallop             Dental    Dentition: Caps/crowns and Bridges     Pulmonary  Breath sounds clear to auscultation               Abdominal  GI exam deferred       Other Findings            Anesthetic Plan    ASA: 2  Anesthesia type: total IV anesthesia            Anesthetic plan and risks discussed with: Patient

## 2020-02-13 NOTE — DISCHARGE INSTRUCTIONS
Prostate Biopsy: About This Test  What is it? A prostate biopsy is a type of test. Your doctor takes small tissue samples from your prostate gland. Then another doctor looks at the tissue under a microscope to see if there are cancer cells. This test is done by a doctor who specializes in men's genital and urinary problems (urologist). It can be done in your doctor's office, a day surgery clinic, or a hospital operating room. To get the tissue samples from the prostate, the doctor inserts a thin needle through the rectum, the urethra, or the area between the anus and scrotum (perineum). The most common method is through the rectum. Your doctor may use ultrasound to help guide the needle. Why is this test done? You may need a prostate biopsy if your doctor found something of concern during a digital rectal exam. Or you may need it if a blood test showed a high level of prostate-specific antigen (PSA). A biopsy can help find out if you have prostate cancer. What happens during the test?  Some men have an MRI of the prostate before their biopsy. This helps to find the areas in the prostate to take biopsy samples. If you have an MRI, your doctor will use ultrasound and the MRI results to find the areas to biopsy. Through the rectum  · You may be asked to kneel, lie on your side, or lie on your back. · Your doctor may inject an anesthetic around the prostate to numb the area before the sample is taken. · Ultrasound is often used to guide the needle to the correct spot. · Your doctor may choose to use a needle guide for the biopsy. He or she will attach the guide to a finger. Your doctor will insert the finger into your rectum. · The needle will enter the prostate and take 6 to 12 samples. What else should you know about this test?  · A prostate biopsy has a slight risk of causing problems such as infection or bleeding.   · If the biopsy went through your rectum, you may have a small amount of bleeding from your rectum for 2 to 3 days after the biopsy. · You may have a little pain in your pelvic area. You may also have a little blood in your urine for 1 to 5 days. · You may have some blood in your semen for a week or longer. How long will the test take? This test will take about 15 to 45 minutes. What happens after the test?  Your doctor will tell you what to expect and watch for after your test. In general:  · If you have anesthesia that makes you sleep, you will be in a recovery room for a few hours. You will need someone to drive you home. You may feel tired for the rest of the day. · You will probably be able to go home right away. · If your doctor had you stop taking any medicine for the biopsy, ask him or her when you can start taking it again. If you were given antibiotics, take them as directed. · Do not do heavy work or exercise for 4 hours after the test.  · Do not take baths or soak in hot tub or pool for 2 weeks. You may shower. · Do not strain for bowel movements. Take a stool softener. · Your doctor will tell you how long it may take to get your results back. Follow-up care is a key part of your treatment and safety. Be sure to make and go to all appointments, and call your doctor if you are having problems. It's also a good idea to keep a list of the medicines you take. Ask your doctor when you can expect to have your test results. Where can you learn more? Go to http://kieran-jose.info/. Enter Y504 in the search box to learn more about \"Prostate Biopsy: About This Test.\"  Current as of: December 19, 2018  Content Version: 12.2  © 1486-4682 [employer] Care instructions adapted under license by [employer] (which disclaims liability or warranty for this information).  If you have questions about a medical condition or this instruction, always ask your healthcare professional. [address] any warranty or liability for your use of this information.

## 2020-02-13 NOTE — H&P
Harjinder Unger  : 1945         Chief Complaint   Patient presents with    Elevated PSA         HPI      Harjinder Unger is a 69 y.o. male   with history of elevated PSA and LUTS. . The patient has had one previous prostate biopsy. The biopsy results were negative. His brother did have a negative prostate bx and did have a TURP.     PSA was 4.01 in 2009. [facility] PSA is 5.938 in May 2010. [facility] Total PSA 5.24, 31 % free PSA in 3-09. Had negative prostate biopsy in . MEASUREMENTS:  Volume: 62 ml  PSA DENSITY:0.06 ( nl < 0.15)      PSA 4.7 then. PSA:5.938 ( 5/6/10)  PSA 7.14 in . Total PSA of 5.52 with 18% free PSA in . PSA 4.42 in . pSA 2.13 in .      Pt. with history of BPH with Monna Precise laser photovaporization of the prostate in . Started on Flomax and is voiding well.   PSA 2.38 (2/15/13). Voiding well. PSA 2.6 in -. PSA 3.5 in 2015, 4.1 in 8-15. PSA 4.4 in 11-15. In 11-15, total PSA 4.9 with 42% free PSA. PSA 4.3 in -, 5.1 in -17. In , total PSA 5.0 with 39.8% free PSA.      PSA 5.4 in 3-. PSA 7.5 in 10- and 12.2 in -. No dysuria or hematuria .          Past Medical History:   Diagnosis Date    Abnormal glucose 2015    Actinic keratoses 2016     Of forearms     Benign essential HTN 2015    BPH (benign prostatic hypertrophy) 2015    Calculus of kidney 2015    Chronic insomnia 2015    Degeneration of cervical intervertebral disc 2015    Edema 2015    Elevated PSA 2015     negative prostate biopsy in . MEASUREMENTS: Volume: 62 ml PSA DENSITY:0.06 ( nl < 0.15)  PSA 4.7 then.       Essential tremor 2015    GERD (gastroesophageal reflux disease) 2015    Hyperlipidemia      Impotence of organic origin      Mild sleep apnea 2015    Skin cancer 2016    SNHL (sensorineural hearing loss) 2016            Past Surgical History:   Procedure Laterality Date    HX HEENT         TONSILLECTOMY AS A CHILD     HX HERNIA REPAIR   05/2006     INGUINAL     HX MALIGNANT SKIN LESION EXCISION         BCC RESECTION    HX ORTHOPAEDIC         rt foot ganglion cyst    HX OTHER SURGICAL   03/2013     SKIN SURGERY    HX OTHER SURGICAL         ingrown toenail    HX PROSTATECTOMY         prostate bx;green light surgery    HX UROLOGICAL   2007     PROSTATE LASER PROCEDURE     HX UROLOGICAL   2012     CYSTOSCOPY AND CAUTERIZATION OF PROSTATE AFTER BLEEDING FROM CATH             Current Outpatient Medications   Medication Sig Dispense Refill    montelukast (SINGULAIR) 10 mg tablet TAKE 1 TABLET BY MOUTH  DAILY 90 Tab 4    triamterene-hydroCHLOROthiazide (MAXZIDE) 37.5-25 mg per tablet TAKE 1 TABLET BY MOUTH  DAILY 90 Tab 3    atorvastatin (LIPITOR) 20 mg tablet TAKE 1 TABLET BY MOUTH  DAILY 90 Tab 4    amLODIPine (NORVASC) 10 mg tablet TAKE 1 TABLET BY MOUTH  DAILY FOR HYPERTENSION 90 Tab 3    tamsulosin (FLOMAX) 0.4 mg capsule Take 1 Cap by mouth daily. 90 Cap 4    omeprazole (PRILOSEC) 20 mg capsule Take 1 Cap by mouth daily. 90 Cap 4    albuterol (PROVENTIL HFA, VENTOLIN HFA, PROAIR HFA) 90 mcg/actuation inhaler Take 1 Puff by inhalation every four (4) hours as needed for Wheezing.  1 Inhaler 3    fexofenadine (ALLEGRA) 180 mg tablet Take 180 mg by mouth daily.        aspirin 81 mg tablet Take 81 mg by mouth daily.                Allergies   Allergen Reactions    Latex Rash    Adhesive Rash       tape    Efudex [Fluorouracil] Rash    Nexium [Esomeprazole Magnesium] Other (comments)       Restless legs, insomnia    Paba [P-Aminobenzoic Acid] Rash    Retin-A [Tretinoin] Rash    Viagra [Sildenafil] Other (comments)       Visual disturbances      Social History            Socioeconomic History    Marital status:        Spouse name: Not on file    Number of children: Not on file    Years of education: Not on file    Highest education level: Not on file Occupational History    Not on file   Social Needs    Financial resource strain: Not on file    Food insecurity:       Worry: Not on file       Inability: Not on file    Transportation needs:       Medical: Not on file       Non-medical: Not on file   Tobacco Use    Smoking status: Former Smoker       Packs/day: 0.25       Years: 5.00       Pack years: 1.25       Last attempt to quit: 1973       Years since quittin.2    Smokeless tobacco: Former User       Quit date: 1973   Substance and Sexual Activity    Alcohol use:  Yes       Comment: OCCASIONAL     Drug use: No    Sexual activity: Not on file   Lifestyle    Physical activity:       Days per week: Not on file       Minutes per session: Not on file    Stress: Not on file   Relationships    Social connections:       Talks on phone: Not on file       Gets together: Not on file       Attends [employer] service: Not on file       Active member of club or organization: Not on file       Attends meetings of clubs or organizations: Not on file       Relationship status: Not on file    Intimate partner violence:       Fear of current or ex partner: Not on file       Emotionally abused: Not on file       Physically abused: Not on file       Forced sexual activity: Not on file   Other Topics Concern     Service Not Asked    Blood Transfusions Not Asked    Caffeine Concern Not Asked    Occupational Exposure Not Asked    Hobby Hazards Not Asked    Sleep Concern Not Asked    Stress Concern Not Asked    Weight Concern Not Asked    Special Diet Not Asked    Back Care Not Asked    Exercise Yes       Comment: walking again now    Bike Helmet Not Asked    Seat Belt Not Asked    Self-Exams Not Asked   Social History Narrative     , retired,             Family History   Problem Relation Age of Onset    Hypertension Mother      Migraines Mother      Heart Attack Mother      Cancer Father           LUNG, brain    Hypertension Father      Tremors Father      Cancer Brother      Other Brother           PEPTIC ULCER DISEASE    Tremors Paternal Grandfather      Migraines Daughter           Review of Systems  Constitutional:   Negative for fever and headaches. ENT:  Negative for high frequency hearing loss. Respiratory:  Negative for shortness of breath. Cardiovascular:  Negative for chest pain. GI:  Negative for abdominal pain. Genitourinary:  Negative for urinary burning and hematuria. Musculoskeletal:  Negative for back pain. Neurological:  Negative for numbness. Psychological:  Negative for depression. Endocrine:  Negative for fatigue. Hem/Lymphatic:  Negative for easy bruising.        Visit Vitals  /76   Pulse 62   Temp 97.9 °F (36.6 °C) (Oral)   Wt 194 lb (88 kg)   BMI 30.38 kg/m²   PE: Physical Exam  General   Mental Status - Patient is alert and oriented X3. Build & Nutrition - Well nourished. Chest and Lung Exam   Chest and lung exam reveals  - normal excursion with symmetric chest walls, quiet, even and easy respiratory effort with no use of accessory muscles and on auscultation, normal breath sounds, no adventitious sounds and normal vocal resonance. Cardiovascular   Cardiovascular examination reveals  - normal heart sounds, regular rate and rhythm with no murmurs. Abdomen   Palpation/Percussion: Palpation and Percussion of the abdomen reveal - Non Tender, No Rebound tenderness, No Rigidity (guarding), No hepatosplenomegaly, No Palpable abdominal masses and Soft. Hernia - Bilateral - No Hernia(s) present. prostate 1+, right > left, firm at right base .       Assessment and Plan      ICD-10-CM ICD-9-CM     1. Elevated PSA R97.20 790.93 AMB POC URINALYSIS DIP STICK AUTO W/ MICRO (PGU)         MRI PELV W WO CONT   2.  BPH with obstruction/lower urinary tract symptoms N40.1 600.01       N13.8 599.69                 Orders Placed This Encounter    55174 MRI PELV W WO CONT       Standing Status:   Future       Standing Expiration Date:   1/18/2021       Order Specific Question:   Is Patient Allergic to Contrast Dye?       Answer:   Unknown       Order Specific Question:   STAT Creatinine as indicated       Answer: Yes    AMB POC URINALYSIS DIP STICK AUTO W/ MICRO (PGU)   MRI 1-11-20: FINDINGS:  -PROSTATE SIZE: 5.6 x 4.5 x 5.7 cm  -CENTRAL GLAND: Moderate central gland hypertrophy. No discrete mass. -PERIPHERAL GLAND: Relative low signal in the right apex with corresponding  restricted diffusion. No other discrete peripheral zone lesions. -CAPSULE: Capsule appears intact. No perineural tumor. -SEMINAL VESICLES: Normal.     -LYMPH NODES: No significant adenopathy. -BONES: No bony lesions. Degenerative disc disease at L5-S1.  -BLADDER/RECTUM: Normal.     IMPRESSION  IMPRESSION:   1. Small right apex lesion concerning for malignancy. PI-Rads 4  2. No evidence of adenopathy or metastatic disease in the lower abdomen/pelvis  Plan MRI fusion biopsy.

## 2020-05-11 PROBLEM — R41.3 MEMORY CHANGES: Status: ACTIVE | Noted: 2020-05-11

## 2020-05-11 PROBLEM — C61 PROSTATE CANCER (HCC): Status: ACTIVE | Noted: 2020-05-11

## 2020-10-27 PROBLEM — R07.89 CHEST DISCOMFORT: Status: RESOLVED | Noted: 2017-02-10 | Resolved: 2020-10-27

## 2020-10-27 PROBLEM — J45.909 REACTIVE AIRWAY DISEASE WITHOUT COMPLICATION: Status: ACTIVE | Noted: 2019-03-22

## 2020-11-04 PROBLEM — M43.15 SPONDYLOLISTHESIS OF THORACOLUMBAR REGION: Status: ACTIVE | Noted: 2020-11-04

## 2020-11-04 PROBLEM — J45.909 REACTIVE AIRWAY DISEASE WITHOUT COMPLICATION: Status: RESOLVED | Noted: 2019-03-22 | Resolved: 2020-11-04

## 2021-01-15 ENCOUNTER — HOSPITAL ENCOUNTER (OUTPATIENT)
Dept: MRI IMAGING | Age: 76
Discharge: HOME | End: 2021-01-15
Attending: UROLOGY
Payer: MEDICARE

## 2021-01-15 DIAGNOSIS — C61 MALIGNANT NEOPLASM OF PROSTATE (HCC): ICD-10-CM

## 2021-01-15 PROCEDURE — 74011000258 HC RX REV CODE- 258: Performed by: UROLOGY

## 2021-01-15 PROCEDURE — 74011250636 HC RX REV CODE- 250/636: Performed by: UROLOGY

## 2021-01-15 PROCEDURE — A9575 INJ GADOTERATE MEGLUMI 0.1ML: HCPCS | Performed by: UROLOGY

## 2021-01-15 PROCEDURE — 72197 MRI PELVIS W/O & W/DYE: CPT

## 2021-01-15 RX ORDER — GADOTERATE MEGLUMINE 376.9 MG/ML
18 INJECTION INTRAVENOUS
Status: COMPLETED | OUTPATIENT
Start: 2021-01-15 | End: 2021-01-15

## 2021-01-15 RX ORDER — SODIUM CHLORIDE 0.9 % (FLUSH) 0.9 %
10 SYRINGE (ML) INJECTION
Status: COMPLETED | OUTPATIENT
Start: 2021-01-15 | End: 2021-01-15

## 2021-01-15 RX ADMIN — SODIUM CHLORIDE 100 ML: 900 INJECTION, SOLUTION INTRAVENOUS at 10:13

## 2021-01-15 RX ADMIN — Medication 10 ML: at 10:13

## 2021-01-15 RX ADMIN — GADOTERATE MEGLUMINE 18 ML: 376.9 INJECTION INTRAVENOUS at 10:13

## 2021-02-16 ENCOUNTER — HOSPITAL ENCOUNTER (OUTPATIENT)
Dept: MRI IMAGING | Age: 76
Discharge: HOME | End: 2021-02-16
Attending: UROLOGY
Payer: MEDICARE

## 2021-02-16 DIAGNOSIS — K86.89 PANCREATIC MASS: ICD-10-CM

## 2021-02-16 PROCEDURE — 74011250636 HC RX REV CODE- 250/636: Performed by: UROLOGY

## 2021-02-16 PROCEDURE — 74183 MRI ABD W/O CNTR FLWD CNTR: CPT

## 2021-02-16 PROCEDURE — 74011000258 HC RX REV CODE- 258: Performed by: UROLOGY

## 2021-02-16 PROCEDURE — A9575 INJ GADOTERATE MEGLUMI 0.1ML: HCPCS | Performed by: UROLOGY

## 2021-02-16 RX ORDER — GADOTERATE MEGLUMINE 376.9 MG/ML
16 INJECTION INTRAVENOUS
Status: COMPLETED | OUTPATIENT
Start: 2021-02-16 | End: 2021-02-16

## 2021-02-16 RX ORDER — SODIUM CHLORIDE 0.9 % (FLUSH) 0.9 %
10 SYRINGE (ML) INJECTION
Status: COMPLETED | OUTPATIENT
Start: 2021-02-16 | End: 2021-02-16

## 2021-02-16 RX ADMIN — SODIUM CHLORIDE 100 ML: 900 INJECTION, SOLUTION INTRAVENOUS at 10:02

## 2021-02-16 RX ADMIN — Medication 10 ML: at 10:02

## 2021-02-16 RX ADMIN — GADOTERATE MEGLUMINE 16 ML: 376.9 INJECTION INTRAVENOUS at 10:02

## 2021-03-31 ENCOUNTER — ANESTHESIA EVENT (OUTPATIENT)
Dept: SURGERY | Age: 76
End: 2021-03-31
Payer: MEDICARE

## 2021-04-01 ENCOUNTER — HOSPITAL ENCOUNTER (OUTPATIENT)
Age: 76
Setting detail: OUTPATIENT SURGERY
Discharge: HOME | End: 2021-04-01
Attending: UROLOGY | Admitting: UROLOGY
Payer: MEDICARE

## 2021-04-01 ENCOUNTER — ANESTHESIA (OUTPATIENT)
Dept: SURGERY | Age: 76
End: 2021-04-01
Payer: MEDICARE

## 2021-04-01 VITALS
DIASTOLIC BLOOD PRESSURE: 74 MMHG | HEART RATE: 47 BPM | RESPIRATION RATE: 12 BRPM | SYSTOLIC BLOOD PRESSURE: 119 MMHG | TEMPERATURE: 98.4 F | OXYGEN SATURATION: 97 % | WEIGHT: 170 LBS | BODY MASS INDEX: 26.63 KG/M2

## 2021-04-01 DIAGNOSIS — R97.20 ELEVATED PSA: ICD-10-CM

## 2021-04-01 LAB — POTASSIUM BLD-SCNC: 4 MMOL/L (ref 3.5–5.1)

## 2021-04-01 PROCEDURE — 77030003481 HC NDL BIOP GUN BARD -B: Performed by: UROLOGY

## 2021-04-01 PROCEDURE — 74011250637 HC RX REV CODE- 250/637: Performed by: ANESTHESIOLOGY

## 2021-04-01 PROCEDURE — 88305 TISSUE EXAM BY PATHOLOGIST: CPT

## 2021-04-01 PROCEDURE — 74011250636 HC RX REV CODE- 250/636: Performed by: UROLOGY

## 2021-04-01 PROCEDURE — 76942 ECHO GUIDE FOR BIOPSY: CPT | Performed by: UROLOGY

## 2021-04-01 PROCEDURE — 2709999900 HC NON-CHARGEABLE SUPPLY: Performed by: UROLOGY

## 2021-04-01 PROCEDURE — 76060000032 HC ANESTHESIA 0.5 TO 1 HR: Performed by: UROLOGY

## 2021-04-01 PROCEDURE — 74011000258 HC RX REV CODE- 258: Performed by: UROLOGY

## 2021-04-01 PROCEDURE — 74011000250 HC RX REV CODE- 250: Performed by: NURSE ANESTHETIST, CERTIFIED REGISTERED

## 2021-04-01 PROCEDURE — 76210000021 HC REC RM PH II 0.5 TO 1 HR: Performed by: UROLOGY

## 2021-04-01 PROCEDURE — 74011250636 HC RX REV CODE- 250/636: Performed by: ANESTHESIOLOGY

## 2021-04-01 PROCEDURE — 76210000063 HC OR PH I REC FIRST 0.5 HR: Performed by: UROLOGY

## 2021-04-01 PROCEDURE — 55700 PR BIOPSY OF PROSTATE,NEEDLE/PUNCH: CPT | Performed by: UROLOGY

## 2021-04-01 PROCEDURE — 84132 ASSAY OF SERUM POTASSIUM: CPT

## 2021-04-01 PROCEDURE — 74011250636 HC RX REV CODE- 250/636: Performed by: NURSE ANESTHETIST, CERTIFIED REGISTERED

## 2021-04-01 PROCEDURE — 76010000138 HC OR TIME 0.5 TO 1 HR: Performed by: UROLOGY

## 2021-04-01 RX ORDER — ONDANSETRON 2 MG/ML
4 INJECTION INTRAMUSCULAR; INTRAVENOUS ONCE
Status: DISCONTINUED | OUTPATIENT
Start: 2021-04-01 | End: 2021-04-01 | Stop reason: HOSPADM

## 2021-04-01 RX ORDER — LIDOCAINE HYDROCHLORIDE 10 MG/ML
0.1 INJECTION INFILTRATION; PERINEURAL AS NEEDED
Status: DISCONTINUED | OUTPATIENT
Start: 2021-04-01 | End: 2021-04-01 | Stop reason: HOSPADM

## 2021-04-01 RX ORDER — HYDROMORPHONE HYDROCHLORIDE 1 MG/ML
0.5 INJECTION, SOLUTION INTRAMUSCULAR; INTRAVENOUS; SUBCUTANEOUS
Status: DISCONTINUED | OUTPATIENT
Start: 2021-04-01 | End: 2021-04-01 | Stop reason: HOSPADM

## 2021-04-01 RX ORDER — NALOXONE HYDROCHLORIDE 0.4 MG/ML
0.1 INJECTION, SOLUTION INTRAMUSCULAR; INTRAVENOUS; SUBCUTANEOUS AS NEEDED
Status: DISCONTINUED | OUTPATIENT
Start: 2021-04-01 | End: 2021-04-01 | Stop reason: HOSPADM

## 2021-04-01 RX ORDER — PROPOFOL 10 MG/ML
INJECTION, EMULSION INTRAVENOUS
Status: DISCONTINUED | OUTPATIENT
Start: 2021-04-01 | End: 2021-04-01 | Stop reason: HOSPADM

## 2021-04-01 RX ORDER — OXYCODONE HYDROCHLORIDE 5 MG/1
5 TABLET ORAL
Status: COMPLETED | OUTPATIENT
Start: 2021-04-01 | End: 2021-04-01

## 2021-04-01 RX ORDER — MIDAZOLAM HYDROCHLORIDE 1 MG/ML
2 INJECTION, SOLUTION INTRAMUSCULAR; INTRAVENOUS
Status: DISCONTINUED | OUTPATIENT
Start: 2021-04-01 | End: 2021-04-01 | Stop reason: HOSPADM

## 2021-04-01 RX ORDER — SODIUM CHLORIDE, SODIUM LACTATE, POTASSIUM CHLORIDE, CALCIUM CHLORIDE 600; 310; 30; 20 MG/100ML; MG/100ML; MG/100ML; MG/100ML
1000 INJECTION, SOLUTION INTRAVENOUS CONTINUOUS
Status: DISCONTINUED | OUTPATIENT
Start: 2021-04-01 | End: 2021-04-01 | Stop reason: HOSPADM

## 2021-04-01 RX ORDER — DIPHENHYDRAMINE HYDROCHLORIDE 50 MG/ML
12.5 INJECTION, SOLUTION INTRAMUSCULAR; INTRAVENOUS ONCE
Status: DISCONTINUED | OUTPATIENT
Start: 2021-04-01 | End: 2021-04-01 | Stop reason: HOSPADM

## 2021-04-01 RX ORDER — CIPROFLOXACIN 500 MG/1
250 TABLET ORAL 2 TIMES DAILY
COMMUNITY
End: 2021-05-12

## 2021-04-01 RX ORDER — OXYCODONE HYDROCHLORIDE 5 MG/1
10 TABLET ORAL
Status: DISCONTINUED | OUTPATIENT
Start: 2021-04-01 | End: 2021-04-01 | Stop reason: HOSPADM

## 2021-04-01 RX ORDER — FENTANYL CITRATE 50 UG/ML
100 INJECTION, SOLUTION INTRAMUSCULAR; INTRAVENOUS AS NEEDED
Status: DISCONTINUED | OUTPATIENT
Start: 2021-04-01 | End: 2021-04-01 | Stop reason: HOSPADM

## 2021-04-01 RX ORDER — PROPOFOL 10 MG/ML
INJECTION, EMULSION INTRAVENOUS AS NEEDED
Status: DISCONTINUED | OUTPATIENT
Start: 2021-04-01 | End: 2021-04-01 | Stop reason: HOSPADM

## 2021-04-01 RX ORDER — LIDOCAINE HYDROCHLORIDE 20 MG/ML
INJECTION, SOLUTION EPIDURAL; INFILTRATION; INTRACAUDAL; PERINEURAL AS NEEDED
Status: DISCONTINUED | OUTPATIENT
Start: 2021-04-01 | End: 2021-04-01 | Stop reason: HOSPADM

## 2021-04-01 RX ORDER — SODIUM CHLORIDE, SODIUM LACTATE, POTASSIUM CHLORIDE, CALCIUM CHLORIDE 600; 310; 30; 20 MG/100ML; MG/100ML; MG/100ML; MG/100ML
75 INJECTION, SOLUTION INTRAVENOUS CONTINUOUS
Status: DISCONTINUED | OUTPATIENT
Start: 2021-04-01 | End: 2021-04-01 | Stop reason: HOSPADM

## 2021-04-01 RX ADMIN — PROPOFOL 20 MG: 10 INJECTION, EMULSION INTRAVENOUS at 11:58

## 2021-04-01 RX ADMIN — LIDOCAINE HYDROCHLORIDE 50 MG: 20 INJECTION, SOLUTION EPIDURAL; INFILTRATION; INTRACAUDAL; PERINEURAL at 11:48

## 2021-04-01 RX ADMIN — PROPOFOL 60 MG: 10 INJECTION, EMULSION INTRAVENOUS at 11:48

## 2021-04-01 RX ADMIN — SODIUM CHLORIDE, SODIUM LACTATE, POTASSIUM CHLORIDE, AND CALCIUM CHLORIDE 1000 ML: 600; 310; 30; 20 INJECTION, SOLUTION INTRAVENOUS at 11:39

## 2021-04-01 RX ADMIN — CEFTRIAXONE 1 G: 1 INJECTION, POWDER, FOR SOLUTION INTRAMUSCULAR; INTRAVENOUS at 11:48

## 2021-04-01 RX ADMIN — PROPOFOL 20 MG: 10 INJECTION, EMULSION INTRAVENOUS at 12:01

## 2021-04-01 RX ADMIN — PROPOFOL 120 MCG/KG/MIN: 10 INJECTION, EMULSION INTRAVENOUS at 11:49

## 2021-04-01 RX ADMIN — OXYCODONE 5 MG: 5 TABLET ORAL at 12:39

## 2021-04-01 NOTE — DISCHARGE INSTRUCTIONS
Tylenol for pain. Peripad as needed  Avoid straining/heavy lifting. No tubs/swimming for 24 hours  Expect blood in stool and urine for a couple days  Call the office for excessive pain or high fever, excessive bleeding after 2 days . ACTIVITY  · As tolerated and as directed by your doctor. · Bathe or shower as directed by your doctor. DIET  · Clear liquids until no nausea or vomiting; then light diet for the first day. · Advance to regular diet on second day, unless your doctor orders otherwise. · If nausea and vomiting continues, call your doctor. PAIN  · Take pain medication as directed by your doctor. · Call your doctor if pain is NOT relieved by medication. · DO NOT take aspirin of blood thinners unless directed by your doctor. CALL YOUR DOCTOR IF   · Temperature of 101 degrees F or above    AFTER ANESTHESIA   · For the first 24 hours: DO NOT Drive, Drink alcoholic beverages, or Make important decisions. · Be aware of dizziness following anesthesia and while taking pain medication. DISCHARGE SUMMARY from Nurse    PATIENT INSTRUCTIONS:    After general anesthesia or intravenous sedation, for 24 hours or while taking prescription Narcotics:  · Limit your activities  · Do not drive and operate hazardous machinery  · Do not make important personal or business decisions  · Do  not drink alcoholic beverages  · If you have not urinated within 8 hours after discharge, please contact your surgeon on call. *  Please give a list of your current medications to your Primary Care Provider. *  Please update this list whenever your medications are discontinued, doses are      changed, or new medications (including over-the-counter products) are added. *  Please carry medication information at all times in case of emergency situations.       These are general instructions for a healthy lifestyle:    No smoking/ No tobacco products/ Avoid exposure to second hand smoke    Surgeon General's Warning:  Quitting smoking now greatly reduces serious risk to your health. Obesity, smoking, and sedentary lifestyle greatly increases your risk for illness    A healthy diet, regular physical exercise & weight monitoring are important for maintaining a healthy lifestyle    You may be retaining fluid if you have a history of heart failure or if you experience any of the following symptoms:  Weight gain of 3 pounds or more overnight or 5 pounds in a week, increased swelling in our hands or feet or shortness of breath while lying flat in bed. Please call your doctor as soon as you notice any of these symptoms; do not wait until your next office visit. Recognize signs and symptoms of STROKE:    F-face looks uneven    A-arms unable to move or move unevenly    S-speech slurred or non-existent    T-time-call 911 as soon as signs and symptoms begin-DO NOT go       Back to bed or wait to see if you get better-TIME IS BRAIN.

## 2021-04-01 NOTE — H&P
Michael Shelton   : 1945       Chief Complaint   Patient presents with    Follow-up    Prostate Cancer     HPI   Michael Shelton is a 70 y.o. male with history of elevated PSA and LUTS. . The patient has had one previous prostate biopsy. The biopsy results were negative. PSA was 4.01 in 2009. [facility] PSA is 5.938 in May 2010. [facility] Total PSA 5.24, 31 % free PSA in 3-09. Had negative prostate biopsy in . MEASUREMENTS:   Volume: 62 ml   PSA DENSITY:0.06 ( nl < 0.15)   PSA 4.7 then. PSA:5.938 ( 5/6/10)   PSA 7.14 in . Total PSA of 5.52 with 18% free PSA in . PSA 4.42 in . pSA 2.13 in . Pt. with history of BPH with Celestine Hodgkin laser photovaporization of the prostate in . Started on Flomax and is voiding well. PSA 2.38 (2/15/13). Voiding well. PSA 2.6 in -. PSA 3.5 in 2015, 4.1 in 8-15. PSA 4.4 in 11-15. In 11-15, total PSA 4.9 with 42% free PSA. PSA 4.3 in -, 5.1 in -17. In , total PSA 5.0 with 39.8% free PSA. PSA 5.4 in 3-. PSA 7.5 in 10-19 and 12.2 in . MRI 20: FINDINGS:   -PROSTATE SIZE: 5.6 x 4.5 x 5.7 cm   -CENTRAL GLAND: Moderate central gland hypertrophy. No discrete mass. -PERIPHERAL GLAND: Relative low signal in the right apex with corresponding   restricted diffusion. No other discrete peripheral zone lesions. -CAPSULE: Capsule appears intact. No perineural tumor. -SEMINAL VESICLES: Normal.   -LYMPH NODES: No significant adenopathy. -BONES: No bony lesions. Degenerative disc disease at L5-S1.   -BLADDER/RECTUM: Normal.   IMPRESSION   IMPRESSION:   1. Small right apex lesion concerning for malignancy. PI-Rads 4   2.  No evidence of adenopathy or metastatic disease in the lower abdomen/pelvis   MRI fusion biopsy 2-13-20: PROSTATE VOLUME: 68 gm PSAD 0.18   Path:   DIAGNOSIS   A: \"PROSTATE, REGION OF INTEREST #1 RIGHT APEX, BIOPSY\": FOCAL HIGH GRADE PROSTATIC INTRAEPITHELIAL NEOPLASIA WITH ADJACENT ATYPICAL GLANDS SUSPICIOUS FOR CARCINOMA. B: \"PROSTATE, RIGHT APEX, BIOPSY\": PROSTATIC ADENOCARCINOMA, SHANNAN SCORE   3 + 3 = 6 (GRADE GROUP 1), INVOLVING 5% OF BIOPSY. C: \"PROSTATE, RIGHT LATERAL APEX, BIOPSY\": PROSTATE TISSUE, NO CARCINOMA IDENTIFIED. D: \"PROSTATE, RIGHT MID, BIOPSY\": PROSTATE TISSUE WITH FOCAL ACUTE AND CHRONIC INFLAMMATION. E: \"PROSTATE, RIGHT LATERAL MID, BIOPSY\": PROSTATE TISSUE, NO CARCINOMA IDENTIFIED. F: \"PROSTATE, RIGHT BASE, BIOPSY\": PROSTATE TISSUE, NO CARCINOMA IDENTIFIED. G: \"PROSTATE, RIGHT LATERAL BASE, BIOPSY\": PROSTATE TISSUE, NO CARCINOMA IDENTIFIED. H: \"PROSTATE, LEFT APEX, BIOPSY\": PROSTATE TISSUE, NO CARCINOMA IDENTIFIED. I: \"PROSTATE, LEFT LATERAL APEX, BIOPSY\": PROSTATE TISSUE, NO CARCINOMA IDENTIFIED. J: \"PROSTATE, LEFT MID, BIOPSY\": PROSTATE TISSUE, NO CARCINOMA IDENTIFIED. K: \"PROSTATE, LEFT LATERAL MID, BIOPSY\": PROSTATE TISSUE, NO CARCINOMA IDENTIFIED. L: \"PROSTATE, LEFT BASE, BIOPSY\": PROSTATE TISSUE, NO CARCINOMA IDENTIFIED. M: PROSTATE, LEFT LATERAL BASE, BIOPSY: PROSTATE TISSUE, NO CARCINOMA IDENTIFIED. stage T1c, grade 3+3 prostate cancer, present in 5% of tissue in one biopsy . Windy Mauricio PSA 12.2. Pt is healthy. Has ED. Stream is good . would be intermediate risk because of the PSA >10. He would like to have at least short term AS. Prolaris score is 3.8 which is 79th percentile for favorable intermediate patient. 3.9% DSM after 10 years if untreated. PSA 6.0 in 5-20. PSA 7.2 in 11-20. Past Medical History:   Diagnosis Date    Abnormal glucose 5/26/2015    Actinic keratoses 7/5/2016    Of forearms     Benign essential HTN 5/26/2015    BPH (benign prostatic hypertrophy) 5/26/2015    Calculus of kidney 5/26/2015    Chronic insomnia 5/26/2015    Degeneration of cervical intervertebral disc 5/26/2015    Edema 5/26/2015    Elevated PSA 4/22/2015    negative prostate biopsy in 2006.  MEASUREMENTS: Volume: 62 ml PSA DENSITY:0.06 ( nl < 0.15) PSA 4.7 then.  Essential tremor 5/26/2015    GERD (gastroesophageal reflux disease) 5/26/2015    Hyperlipidemia     Impotence of organic origin     Mild sleep apnea 5/26/2015    no cpap    Personal history of prostate cancer     Skin cancer 8/1/2016    SNHL (sensorineural hearing loss) 8/1/2016           Past Surgical History:   Procedure Laterality Date    BIOPSY PROSTATE      HX HEENT      TONSILLECTOMY AS A CHILD     HX HERNIA REPAIR  05/2006    INGUINAL     HX MALIGNANT SKIN LESION EXCISION      BCC RESECTION    HX ORTHOPAEDIC      rt foot ganglion cyst    HX OTHER SURGICAL  03/2013    SKIN SURGERY    HX OTHER SURGICAL      ingrown toenail    HX PROSTATECTOMY      prostate bx;green light surgery    HX UROLOGICAL  2007    PROSTATE LASER PROCEDURE     HX UROLOGICAL  2012    CYSTOSCOPY AND CAUTERIZATION OF PROSTATE AFTER BLEEDING FROM CATH            Current Outpatient Medications   Medication Sig Dispense Refill    TURMERIC PO Take 2 Caps by mouth daily.  cholecalciferol, vitamin D3, (Vitamin D3) 50 mcg (2,000 unit) tab Take 2,000 Units by mouth daily.  zinc 50 mg tab tablet Take 50 mg by mouth daily.  montelukast (SINGULAIR) 10 mg tablet TAKE 1 TABLET BY MOUTH EVERY DAY 90 Tab 4    omeprazole (PRILOSEC) 20 mg capsule Take 1 Cap by mouth daily. 90 Cap 4    tamsulosin (FLOMAX) 0.4 mg capsule Take 1 Cap by mouth daily. 90 Cap 4    amLODIPine (NORVASC) 10 mg tablet Take 1 Tab by mouth daily. 90 Tab 3    triamterene-hydroCHLOROthiazide (MAXZIDE) 37.5-25 mg per tablet Take 1 Tab by mouth daily. 90 Tab 3    atorvastatin (LIPITOR) 20 mg tablet Take 1 Tab by mouth daily. 90 Tab 3    fexofenadine (ALLEGRA) 180 mg tablet Take 180 mg by mouth daily.              Allergies   Allergen Reactions    Latex Rash    Adhesive Rash     tape    Efudex [Fluorouracil] Rash    Nexium [Esomeprazole Magnesium] Other (comments)     Restless legs, insomnia    Paba [P-Aminobenzoic Acid] Rash    Retin-A [Tretinoin] Rash    Viagra [Sildenafil] Other (comments)     Visual disturbances     Social History           Socioeconomic History    Marital status:      Spouse name: Not on file    Number of children: Not on file    Years of education: Not on file    Highest education level: Not on file   Occupational History    Not on file   Social Needs    Financial resource strain: Not on file    Food insecurity     Worry: Not on file     Inability: Not on file    Transportation needs     Medical: Not on file     Non-medical: Not on file   Tobacco Use    Smoking status: Former Smoker     Packs/day: 0.25     Years: 5.00     Pack years: 1.25     Quit date: 1973     Years since quittin.3    Smokeless tobacco: Former User     Quit date: 1973   Substance and Sexual Activity    Alcohol use: Yes     Comment: OCCASIONAL     Drug use: No    Sexual activity: Not on file   Lifestyle    Physical activity     Days per week: Not on file     Minutes per session: Not on file    Stress: Not on file   Relationships    Social connections     Talks on phone: Not on file     Gets together: Not on file     Attends [employer] service: Not on file     Active member of club or organization: Not on file     Attends meetings of clubs or organizations: Not on file     Relationship status: Not on file    Intimate partner violence     Fear of current or ex partner: Not on file     Emotionally abused: Not on file     Physically abused: Not on file     Forced sexual activity: Not on file   Other Topics Concern   [address] Service Not Asked    Blood Transfusions Not Asked    Caffeine Concern Not Asked    Occupational Exposure Not Asked   [place] Hazards Not Asked    Sleep Concern Not Asked    Stress Concern Not Asked    Weight Concern Not Asked    Special Diet Not Asked    Back Care Not Asked    Exercise Yes     Comment: walking again now    Bike Helmet Not Asked    [address] Not Asked    Self-Exams Not Asked   Social History Narrative    , retired,            Family History   Problem Relation Age of Onset    Hypertension Mother    Pedersen Migraines Mother     Heart Attack Mother     Cancer Father     LUNG, brain    Hypertension Father     Tremors Father     Cancer Brother     Other Brother     PEPTIC ULCER DISEASE    Tremors Paternal Grandfather     Migraines Daughter      Review of Systems   Constitutional: Negative for fever. GI: Negative for nausea. Genitourinary: Negative for urinary burning. Musculoskeletal: Negative for back pain. Visit Vitals   /84   Pulse 74   Temp 97.7 °F (36.5 °C) (Temporal)   Physical Exam  General   Mental Status - Patient is alert and oriented X3. Build & Nutrition - Well nourished. Chest and Lung Exam   Chest and lung exam reveals  - normal excursion with symmetric chest walls, quiet, even and easy respiratory effort with no use of accessory muscles and on auscultation, normal breath sounds, no adventitious sounds and normal vocal resonance. Cardiovascular   Cardiovascular examination reveals  - normal heart sounds, regular rate and rhythm with no murmurs. Abdomen   Palpation/Percussion: Palpation and Percussion of the abdomen reveal - Non Tender, No Rebound tenderness, No Rigidity (guarding), No hepatosplenomegaly, No Palpable abdominal masses and Soft. Hernia - Bilateral - No Hernia(s) present.     Urinalysis   UA - Dipstick     Physical Exam   Assessment and Plan     ICD-10-CM ICD-9-CM    1. Malignant neoplasm of prostate (HCC)  C61 185 AMB POC URINALYSIS DIP STICK AUTO W/O MICRO (PGU)      MRI PELV W WO CONT           Orders Placed This Encounter    MRI PELV W WO CONT     Standing Status:  Future     Standing Expiration Date:  1/16/2022     Order Specific Question:  STAT Creatinine as indicated     Answer:  Yes     Order Specific Question:  Reason for Exam     Answer:  follow up of prostate cancer    AMB POC URINALYSIS DIP STICK AUTO W/O MICRO (PGU)   will get MRI in 1-21. Consider repeat MRI fusion biopsy. MRI pelvis 1=15=21: Findings:  The prostate gland measures: 5.7 cm transverse by 5.9 cm AP by 5.1 cm  craniocaudal.     Peripheral Zone: Asymmetric signal loss is once again seen in the right apical  peripheral zone on ADC map image 35. The degree of signal loss on this sequence  does appear worsened when compared to the prior study with this confluent  abnormality now measuring 12 mm x 10 mm in size. Although signal changes on the  prior study demonstrated a similar size, prior signal changes appeared  nonconfluent. Early focal enhancement is seen associated with this lesion best  appreciated on axial postcontrast image 43. Some progression of this lesion is  not excluded. No evolving of fine peripheral zone lesion is otherwise seen. Asymmetric atrophy is once again seen of the right peripheral zone at the base  and mid gland.     Central Gland: Moderate to severe enlargement of the central gland is once again  seen. Signal changes of the transitional zone are not definitely changed without  a worrisome evolving asymmetric lesion.     Prostate capsule: No evolving contour abnormality or gross extracapsular  enhancing tumor.     Seminal vesicles: No evidence for involvement.     Neurovascular bundles: No clear evidence for involvement.      Lymph nodes: No evolving pelvic, or retroperitoneal lymphadenopathy is seen.     Bones: Note evolving enhancing osseous lesion.     Other:  Benign-appearing right renal cortical cysts are seen measuring up to 4 cm in  size in the upper pole of the right kidney. A cystic lesion appears directly  adjacent to the pancreatic head on coronal postcontrast image 39 measuring 2.4  cm in size which is incompletely characterized.     IMPRESSION  Impression:   1. Worsening signal changes at the right apex which overall measure 12 mm x 10  mm on the current examination.  Specifically, there are worsening diffuse  weighted changes and increasing defined early focal enhancement. Some  progression of the patient's known neoplasm at this level cannot be excluded. The imaging features remain most consistent with a PIRADS category 4 lesion. No  significant evolving peripheral zone or central gland lesion is otherwise seen. Moderate to severe enlargement of the central gland is seen although the  appearance is unchanged most consistent with BPH. No findings concerning for  evolving extra capsular extension or metastatic disease are seen     2. 2.4 cm cystic lesion adjacent to the pancreatic head. It is difficult to  determine whether this arises from or occurs directly adjacent to the pancreatic  head. This can be an indicator of a cystic pancreatic neoplasm. Further  evaluation with a pancreas protocol MRI, or alternatively CT is recommended. Pt here for MRI fusion prostate biopsy.

## 2021-04-01 NOTE — ANESTHESIA PREPROCEDURE EVALUATION
Relevant Problems   RESPIRATORY SYSTEM   (+) Mild sleep apnea      CARDIOVASCULAR   (+) Aortic valve regurgitation   (+) Benign essential HTN   (+) First degree AV block   (+) Nonrheumatic aortic valve insufficiency      GASTROINTESTINAL   (+) GERD (gastroesophageal reflux disease)      RENAL FAILURE   (+) Calculus of kidney      PERSONAL HX & FAMILY HX OF CANCER   (+) Prostate cancer [facility])       Anesthetic History               Review of Systems / Medical History  Patient summary reviewed, nursing notes reviewed and pertinent labs reviewed    Pulmonary        Sleep apnea (mild)           Neuro/Psych             Comments: Essential tremor Cardiovascular    Hypertension          Hyperlipidemia    Exercise tolerance: >4 METS  Comments: TTE March 2019:  LVEF 60%.   Mild-moderate AR, MR   GI/Hepatic/Renal     GERD: well controlled    Renal disease: stones       Endo/Other             Other Findings              Physical Exam    Airway  Mallampati: II  TM Distance: > 6 cm  Neck ROM: decreased range of motion   Mouth opening: Normal     Cardiovascular  Regular rate and rhythm,  S1 and S2 normal,  no murmur, click, rub, or gallop             Dental    Dentition: Caps/crowns and Bridges     Pulmonary  Breath sounds clear to auscultation               Abdominal  GI exam deferred       Other Findings            Anesthetic Plan    ASA: 2  Anesthesia type: total IV anesthesia            Anesthetic plan and risks discussed with: Patient

## 2021-04-01 NOTE — BRIEF OP NOTE
Brief Postoperative Note    Patient: Heide Fink  YOB: 1945  MRN: [id number]    Date of Procedure: 4/1/2021     Pre-Op Diagnosis: Prostate cancer ([address] [place]    Post-Op Diagnosis: Same as preoperative diagnosis.       Procedure(s):  MRI FUSION GUIDED PROSTATE BIOPSY    Surgeon(s):  Arabella Dlevalle MD    Surgical Assistant: None    Anesthesia: MAC     Estimated Blood Loss (mL): Minimal    Complications: None    Specimens:   ID Type Source Tests Collected by Time Destination   1 : JANINE 1 right apex Preservative Prostate  Arabella Delvalle MD 4/1/2021 1205 Pathology   2 : RA Preservative Prostate  Arabella Delvalle MD 4/1/2021 1207 Pathology   3 : RLA Preservative Cherrie Delvalle MD 4/1/2021 1207 Pathology   4 : RM Preservative Cherrie Delvalle MD 4/1/2021 1207 Pathology   5 : RLM Preservative Cherrie Delvalle MD 4/1/2021 1208 Pathology   6 : RB Preservative Cherrie Delvalle MD 4/1/2021 1208 Pathology   7 : RLB Preservative Cherrie Delvalle MD 4/1/2021 1208 Pathology   8 : LA Preservative Prostate  Arabella Delvalle MD 4/1/2021 1209 Pathology   9 : LLA Preservative Cherrie Delvalle MD 4/1/2021 1209 Pathology   10 : LM Preservative Cherrie Delvalle MD 4/1/2021 1209 Pathology   11 : LLSONDRA Preservative Cherrie Delvalle MD 4/1/2021 1210 Pathology   12 : LB Preservative Cherrie Delvalle MD 4/1/2021 1210 Pathology   13 : LLB Preservative Cherrie Delvalle MD 4/1/2021 1211 Pathology        Implants: * No implants in log *    Drains: * No LDAs found *    Findings: see op note    Electronically Signed by Francie Brumfield MD on 4/1/2021 at 12:18 PM

## 2021-04-01 NOTE — ANESTHESIA POSTPROCEDURE EVALUATION
Procedure(s):  MRI FUSION GUIDED PROSTATE BIOPSY. total IV anesthesia    Anesthesia Post Evaluation      Multimodal analgesia: multimodal analgesia used between 6 hours prior to anesthesia start to PACU discharge  Patient location during evaluation: bedside  Patient participation: complete - patient participated  Level of consciousness: responsive to verbal stimuli  Pain management: adequate  Airway patency: patent  Anesthetic complications: no  Cardiovascular status: hemodynamically stable  Respiratory status: spontaneous ventilation  Hydration status: stable    Final Post Anesthesia Temperature Assessment:  Normothermia (36.0-37.5 degrees C)      INITIAL Post-op Vital signs:   Vitals Value Taken Time   /64 04/01/21 1229   Temp 36.9 °C (98.4 °F) 04/01/21 1219   Pulse 51 04/01/21 1231   Resp 12 04/01/21 1229   SpO2 97 % 04/01/21 1231   Vitals shown include unvalidated device data.

## 2021-04-01 NOTE — OP NOTES
Operative Note                Patient: Marilee Barthel [id number]    Date of Surgery: 04/01/21    Preoperative Diagnosis: Elevated psa and prostate lesion(s) on MRI imaging    Postoperative Diagnosis:  same    Surgeon(s) and Role:     *Arsh Timmons MD - Primary     Anesthesia: IV sedation     Procedure: Procedure(s):  Valarie Puckett MRI fused TRUS prostate biopsy     RISKS DISCUSSION:     Discussed the risk of surgery including infection, hematoma, bleeding, and the risks of general anesthetic. The patient understands the risks, any and all questions were answered to the patient's satisfaction and they freely signed the consent for operation. URONAV MRI FUSED TRANSRECTAL ULTRASOUND GUIDED BIOPSY OF THE PROSTATE    All risks, benefits and alternatives were again reviewed and he is willing to proceed at this time. The patient was placed in the left lateral decubitus position. I then inserted the transrectal ultrasound probe into the rectum. The prostate was visualized. The prostate appeared homogenous in appearance. Ultrasonographic sweep of the prostate was performed to obtain images to link to MRI via URONAV. There were 1 regions of interest based upon MRI imaging. 5 biopsies of regions of interest were then obtained using the ultrasound images for guidance that had been linked to the previous MRI using Uronav. I then performed 12 needle core biopsies using a standard sextant biopsy format with traditional ultrasound images for guidance. The ultrasound probe was removed. The patient tolerated the procedure well.       PROSTATE VOLUME:  81 gm   PSA 7.2  PSAD 0.09    Estimated Blood Loss:  minimal    Specimens: prostate biopsies             Drains: none                 Implants: * No implants in log *           Signed By: Arsh Timmons MD

## 2021-04-01 NOTE — PROGRESS NOTES
's pre-procedure visit requested by patient. Conveyed care and concern for patient and family. Offered prayer as requested for patient, family, and staff.     Mark Nieves MDiv, BS  Board Certified

## 2021-05-12 PROBLEM — Z85.46 PERSONAL HISTORY OF PROSTATE CANCER: Status: ACTIVE | Noted: 2021-05-12

## 2021-05-12 PROBLEM — J30.9 ALLERGIC RHINITIS DUE TO ALLERGEN: Status: ACTIVE | Noted: 2021-05-12

## 2021-05-12 PROBLEM — N40.0 BENIGN PROSTATIC HYPERPLASIA: Status: ACTIVE | Noted: 2018-03-07

## 2021-05-12 PROBLEM — R12 HEARTBURN: Status: ACTIVE | Noted: 2021-05-12

## 2021-11-12 PROBLEM — I35.1 NONRHEUMATIC AORTIC VALVE INSUFFICIENCY: Status: RESOLVED | Noted: 2017-03-23 | Resolved: 2021-11-12

## 2021-11-12 PROBLEM — C61 PROSTATE CANCER (HCC): Status: RESOLVED | Noted: 2020-05-11 | Resolved: 2021-11-12

## 2021-11-15 PROBLEM — R25.1 OCCASIONAL TREMORS: Status: ACTIVE | Noted: 2021-11-15

## 2022-02-28 PROBLEM — M25.511 BILATERAL SHOULDER PAIN: Status: ACTIVE | Noted: 2022-02-28

## 2022-02-28 PROBLEM — M25.512 BILATERAL SHOULDER PAIN: Status: ACTIVE | Noted: 2022-02-28

## 2022-03-18 PROBLEM — Z85.46 PERSONAL HISTORY OF PROSTATE CANCER: Status: ACTIVE | Noted: 2021-05-12

## 2022-03-18 PROBLEM — Z11.59 SCREENING FOR VIRAL DISEASE: Status: ACTIVE | Noted: 2018-09-14

## 2022-03-19 PROBLEM — I35.1 AORTIC VALVE REGURGITATION: Status: ACTIVE | Noted: 2019-03-22

## 2022-03-19 PROBLEM — M43.15 SPONDYLOLISTHESIS OF THORACOLUMBAR REGION: Status: ACTIVE | Noted: 2020-11-04

## 2022-03-19 PROBLEM — G89.29 CHRONIC LEFT SHOULDER PAIN: Status: ACTIVE | Noted: 2018-03-01

## 2022-03-19 PROBLEM — M54.32 SCIATICA OF LEFT SIDE: Status: ACTIVE | Noted: 2019-01-08

## 2022-03-19 PROBLEM — N40.0 BENIGN PROSTATIC HYPERPLASIA: Status: ACTIVE | Noted: 2018-03-07

## 2022-03-19 PROBLEM — M25.512 CHRONIC LEFT SHOULDER PAIN: Status: ACTIVE | Noted: 2018-03-01

## 2022-03-19 PROBLEM — R25.1 OCCASIONAL TREMORS: Status: ACTIVE | Noted: 2021-11-15

## 2022-03-19 PROBLEM — Z88.9 H/O SEASONAL ALLERGIES: Status: ACTIVE | Noted: 2017-02-10

## 2022-03-19 PROBLEM — R12 HEARTBURN: Status: ACTIVE | Noted: 2021-05-12

## 2022-03-19 PROBLEM — J30.9 ALLERGIC RHINITIS DUE TO ALLERGEN: Status: ACTIVE | Noted: 2021-05-12

## 2022-03-20 PROBLEM — R41.3 MEMORY CHANGES: Status: ACTIVE | Noted: 2020-05-11

## 2022-03-20 PROBLEM — I44.0 FIRST DEGREE AV BLOCK: Status: ACTIVE | Noted: 2017-02-10

## 2022-03-20 PROBLEM — Z85.820 HISTORY OF MELANOMA: Status: ACTIVE | Noted: 2018-09-14

## 2022-03-20 PROBLEM — Z86.69 HX OF SCIATICA: Status: ACTIVE | Noted: 2019-11-01

## 2022-03-20 PROBLEM — M25.511 BILATERAL SHOULDER PAIN: Status: ACTIVE | Noted: 2022-02-28

## 2022-03-20 PROBLEM — M25.512 BILATERAL SHOULDER PAIN: Status: ACTIVE | Noted: 2022-02-28

## 2022-05-17 DIAGNOSIS — I10 BENIGN ESSENTIAL HTN: ICD-10-CM

## 2022-05-17 DIAGNOSIS — R73.09 ABNORMAL GLUCOSE: ICD-10-CM

## 2022-05-17 DIAGNOSIS — E78.5 HYPERLIPIDEMIA, UNSPECIFIED HYPERLIPIDEMIA TYPE: Primary | ICD-10-CM

## 2022-05-17 DIAGNOSIS — R97.20 ELEVATED PSA: ICD-10-CM

## 2022-06-27 ENCOUNTER — TELEMEDICINE (OUTPATIENT)
Dept: INTERNAL MEDICINE CLINIC | Facility: CLINIC | Age: 77
End: 2022-06-27
Payer: MEDICARE

## 2022-06-27 DIAGNOSIS — J34.89 NASAL PAIN: ICD-10-CM

## 2022-06-27 DIAGNOSIS — J30.9 ALLERGIC RHINITIS, UNSPECIFIED SEASONALITY, UNSPECIFIED TRIGGER: ICD-10-CM

## 2022-06-27 DIAGNOSIS — R09.81 SINUS CONGESTION: Primary | ICD-10-CM

## 2022-06-27 PROCEDURE — 3017F COLORECTAL CA SCREEN DOC REV: CPT | Performed by: NURSE PRACTITIONER

## 2022-06-27 PROCEDURE — G8427 DOCREV CUR MEDS BY ELIG CLIN: HCPCS | Performed by: NURSE PRACTITIONER

## 2022-06-27 PROCEDURE — 1123F ACP DISCUSS/DSCN MKR DOCD: CPT | Performed by: NURSE PRACTITIONER

## 2022-06-27 PROCEDURE — 99213 OFFICE O/P EST LOW 20 MIN: CPT | Performed by: NURSE PRACTITIONER

## 2022-06-27 RX ORDER — PREDNISONE 5 MG/1
TABLET ORAL
Qty: 1 EACH | Refills: 0 | Status: SHIPPED | OUTPATIENT
Start: 2022-06-27 | End: 2022-08-29

## 2022-06-27 RX ORDER — FLUTICASONE PROPIONATE 50 MCG
2 SPRAY, SUSPENSION (ML) NASAL DAILY
Qty: 16 G | Refills: 5 | Status: SHIPPED | OUTPATIENT
Start: 2022-06-27 | End: 2022-10-17

## 2022-06-27 ASSESSMENT — ENCOUNTER SYMPTOMS
SHORTNESS OF BREATH: 0
SINUS PAIN: 1
SORE THROAT: 0
COUGH: 0

## 2022-06-27 NOTE — PROGRESS NOTES
Virtual Visit  Chief Complaint   Patient presents with    Sinus Problem       Nasal congestion and pain: Present for few days. Small amount of clear drainage. Limited air passage. Spraying paint on fence and wondering if this was irritant. Called to see ENT but over 3 years and required referral from PCP. No fever, chills. OTC saline spray with limited benefit      Past Medical History, Past Surgical History, Family history, Social History, and Medications were all reviewed and updated as necessary. Current Outpatient Medications   Medication Sig Dispense Refill    predniSONE 5 MG (21) TBPK As directed 1 each 0    fluticasone (FLONASE) 50 MCG/ACT nasal spray 2 sprays by Each Nostril route daily 16 g 5    amLODIPine (NORVASC) 10 MG tablet TAKE 1 TABLET BY MOUTH EVERY DAY      atorvastatin (LIPITOR) 20 MG tablet Take 20 mg by mouth daily      Cholecalciferol 50 MCG (2000 UT) TABS Take 2,000 Units by mouth daily      fexofenadine (ALLEGRA) 180 MG tablet Take 180 mg by mouth daily      glucosamine-chondroitin 500-400 MG CAPS Take 1 capsule by mouth daily      hydroCHLOROthiazide (HYDRODIURIL) 25 MG tablet Take 25 mg by mouth daily      losartan (COZAAR) 50 MG tablet Take 50 mg by mouth daily      montelukast (SINGULAIR) 10 MG tablet TAKE 1 TABLET BY MOUTH EVERY DAY      omeprazole (PRILOSEC) 20 MG delayed release capsule Take 20 mg by mouth daily      tamsulosin (FLOMAX) 0.4 MG capsule Take 0.4 mg by mouth daily       No current facility-administered medications for this visit.      Allergies   Allergen Reactions    Latex Rash    Esomeprazole Magnesium Other (See Comments)     Restless legs, insomnia    Sildenafil Other (See Comments)     Visual disturbances    Aminobenzoate Rash    Fluorouracil Rash    Tretinoin Rash     Patient Active Problem List   Diagnosis    SNHL (sensorineural hearing loss)    Screening for viral disease    Personal history of prostate cancer    Benign essential HTN  Essential tremor    GERD (gastroesophageal reflux disease)    Chronic insomnia    Hyperlipidemia    H/O seasonal allergies    Elevated PSA    Aortic valve regurgitation    Benign prostatic hyperplasia    Occasional tremors    Heartburn    Allergic rhinitis due to allergen    Impotence of organic origin    Calculus of kidney    Abnormal glucose    Degeneration of cervical intervertebral disc    Mild sleep apnea    Spondylolisthesis of thoracolumbar region    Sciatica of left side    Chronic left shoulder pain    Memory changes    First degree AV block    Bilateral shoulder pain    Actinic keratoses    Hx of sciatica    History of melanoma         Review of Systems   Constitutional: Negative for chills and fever. HENT: Positive for congestion and sinus pain. Negative for postnasal drip, sneezing and sore throat. Respiratory: Negative for cough and shortness of breath. Cardiovascular: Negative for chest pain and palpitations. OBJECTIVE:  There were no vitals taken for this visit. Physical Exam  Vitals reviewed. Constitutional:       General: He is not in acute distress. Appearance: Normal appearance. He is not ill-appearing. HENT:      Nose: Congestion present. Pulmonary:      Effort: Pulmonary effort is normal.   Neurological:      Mental Status: He is alert and oriented to person, place, and time. Psychiatric:         Mood and Affect: Mood normal.         Thought Content: Thought content normal.          Medical problems and test results were reviewed with the patient today. ASSESSMENT and PLAN    Shayna Weeks was seen today for sinus problem.     Diagnoses and all orders for this visit:    Sinus congestion  -     predniSONE 5 MG (21) TBPK; As directed  -     fluticasone (FLONASE) 50 MCG/ACT nasal spray; 2 sprays by Each Nostril route daily    Nasal pain  -     predniSONE 5 MG (21) TBPK; As directed  -     fluticasone (FLONASE) 50 MCG/ACT nasal spray; 2 sprays by Each Nostril route daily    Allergic rhinitis, unspecified seasonality, unspecified trigger    If symptoms of infection develop can consider antibiotic. Also can refer to ENT and patient will call with persistence    Return if symptoms worsen or fail to improve. Ady Wilson was evaluated through a synchronous (real-time) virtual platform audio-video encounter. Ady Wilson (and/or their health care decision maker) is aware that this is a billable service, which includes applicable co-pays and coverage as determined by their insurance carrier. Verbal consent was obtained and patient identification was verified. This visit was conducted pursuant to the emergency declaration under the [facility], [address] [place] [address] authority and the [employer] Act. A caregiver was present when appropriate. Ability to conduct a physical exam was limited. The patient was located at home in the state where the provider is licensed to provide care.

## 2022-08-17 ENCOUNTER — TELEPHONE (OUTPATIENT)
Dept: INTERNAL MEDICINE CLINIC | Facility: CLINIC | Age: 77
End: 2022-08-17

## 2022-08-17 NOTE — TELEPHONE ENCOUNTER
Lab order faxed to [facility] at [facility] per pt request. Pt aware.

## 2022-08-17 NOTE — TELEPHONE ENCOUNTER
Please fax his lab order a week before 8/29/22 (appointment date) to [address] [place] [address] located at [address]. The fax number is [phone].

## 2022-08-22 LAB
AVERAGE GLUCOSE: NORMAL
HBA1C MFR BLD: 5.6 %
PROSTATE SPECIFIC ANTIGEN: 6.6 NG/ML

## 2022-08-29 ENCOUNTER — TELEMEDICINE (OUTPATIENT)
Dept: INTERNAL MEDICINE CLINIC | Facility: CLINIC | Age: 77
End: 2022-08-29
Payer: MEDICARE

## 2022-08-29 DIAGNOSIS — E78.00 PURE HYPERCHOLESTEROLEMIA: ICD-10-CM

## 2022-08-29 DIAGNOSIS — R41.3 MEMORY CHANGES: ICD-10-CM

## 2022-08-29 DIAGNOSIS — M25.512 CHRONIC LEFT SHOULDER PAIN: ICD-10-CM

## 2022-08-29 DIAGNOSIS — G89.29 CHRONIC LEFT SHOULDER PAIN: ICD-10-CM

## 2022-08-29 DIAGNOSIS — R97.20 ELEVATED PSA: ICD-10-CM

## 2022-08-29 DIAGNOSIS — I10 BENIGN ESSENTIAL HTN: Primary | ICD-10-CM

## 2022-08-29 PROCEDURE — 3017F COLORECTAL CA SCREEN DOC REV: CPT | Performed by: INTERNAL MEDICINE

## 2022-08-29 PROCEDURE — 1123F ACP DISCUSS/DSCN MKR DOCD: CPT | Performed by: INTERNAL MEDICINE

## 2022-08-29 PROCEDURE — 99214 OFFICE O/P EST MOD 30 MIN: CPT | Performed by: INTERNAL MEDICINE

## 2022-08-29 PROCEDURE — 1036F TOBACCO NON-USER: CPT | Performed by: INTERNAL MEDICINE

## 2022-08-29 PROCEDURE — G8427 DOCREV CUR MEDS BY ELIG CLIN: HCPCS | Performed by: INTERNAL MEDICINE

## 2022-08-29 PROCEDURE — G8417 CALC BMI ABV UP PARAM F/U: HCPCS | Performed by: INTERNAL MEDICINE

## 2022-08-29 RX ORDER — LOSARTAN POTASSIUM 100 MG/1
100 TABLET ORAL DAILY
Qty: 90 TABLET | Refills: 3 | Status: SHIPPED | OUTPATIENT
Start: 2022-08-29 | End: 2022-11-04 | Stop reason: DRUGHIGH

## 2022-08-29 SDOH — HEALTH STABILITY: PHYSICAL HEALTH: ON AVERAGE, HOW MANY MINUTES DO YOU ENGAGE IN EXERCISE AT THIS LEVEL?: 50 MIN

## 2022-08-29 SDOH — HEALTH STABILITY: PHYSICAL HEALTH: ON AVERAGE, HOW MANY DAYS PER WEEK DO YOU ENGAGE IN MODERATE TO STRENUOUS EXERCISE (LIKE A BRISK WALK)?: 4 DAYS

## 2022-08-29 NOTE — PROGRESS NOTES
(sensorineural hearing loss)    Screening for viral disease    Personal history of prostate cancer    Benign essential HTN    Essential tremor    GERD (gastroesophageal reflux disease)    Chronic insomnia    Hyperlipidemia    H/O seasonal allergies    Elevated PSA    Aortic valve regurgitation    Benign prostatic hyperplasia    Occasional tremors    Heartburn    Allergic rhinitis due to allergen    Impotence of organic origin    Calculus of kidney    Abnormal glucose    Degeneration of cervical intervertebral disc    Mild sleep apnea    Spondylolisthesis of thoracolumbar region    Sciatica of left side    Chronic left shoulder pain    Memory changes    First degree AV block    Bilateral shoulder pain    Actinic keratoses    Hx of sciatica    History of melanoma         Review of Systems   Musculoskeletal:  Positive for arthralgias. Bilateral shoulder, L >R. May hurt every several days and pain is generally relieved with taking Tylenol       OBJECTIVE:  There were no vitals taken for this visit. Physical Exam  Constitutional:       Appearance: Normal appearance. Neurological:      Mental Status: He is alert. Psychiatric:         Mood and Affect: Mood normal.         Behavior: Behavior normal.        Medical problems and test results were reviewed with the patient today. Recent Results (from the past 672 hour(s))   Hemoglobin A1C    Collection Time: 08/22/22 12:00 AM   Result Value Ref Range    Hemoglobin A1C 5.6 %    AVERAGE GLUCOSE     PSA Screening    Collection Time: 08/22/22 12:00 AM   Result Value Ref Range    PSA 6.6 ng/mL         ASSESSMENT and PLAN    1. Benign essential HTN  2. Elevated PSA  3. Chronic left shoulder pain  4. Memory changes  5. Pure hypercholesterolemia     the following changes in treatment are made increased losartan to 100 mg    He will call if his blood pressure is is not controlled on this dosage of medication.   He will call if his shoulders begin to hurt more and will refer him to [address] as he has several prior connections there    He has been walking and encouraged him to walk just a bit more  LDL well controlled  I was at office while conducting this encounter. Consent:  He and/or his healthcare decision maker is aware that this patient-initiated Telehealth encounter is a billable service, with coverage as determined by his insurance carrier. He is aware that he may receive a bill and has provided verbal consent to proceed: Yes    This virtual visit was conducted mychart with audio and visual components     Total Time: 11-20 minutes   We had to switch from computer to phone when his volume on computer did not work    Return in about 6 months (around 2/22/2023) for [facility] 45 min with lab 1 week before.

## 2022-10-17 ENCOUNTER — OFFICE VISIT (OUTPATIENT)
Dept: INTERNAL MEDICINE CLINIC | Facility: CLINIC | Age: 77
End: 2022-10-17
Payer: MEDICARE

## 2022-10-17 ENCOUNTER — TELEPHONE (OUTPATIENT)
Dept: INTERNAL MEDICINE CLINIC | Facility: CLINIC | Age: 77
End: 2022-10-17

## 2022-10-17 VITALS
DIASTOLIC BLOOD PRESSURE: 70 MMHG | WEIGHT: 194 LBS | SYSTOLIC BLOOD PRESSURE: 130 MMHG | BODY MASS INDEX: 30.45 KG/M2 | HEIGHT: 67 IN

## 2022-10-17 DIAGNOSIS — R60.9 EDEMA, UNSPECIFIED TYPE: ICD-10-CM

## 2022-10-17 DIAGNOSIS — I10 BENIGN ESSENTIAL HTN: Primary | ICD-10-CM

## 2022-10-17 DIAGNOSIS — I10 BENIGN ESSENTIAL HTN: ICD-10-CM

## 2022-10-17 LAB
ANION GAP SERPL CALC-SCNC: 8 MMOL/L (ref 2–11)
BUN SERPL-MCNC: 19 MG/DL (ref 8–23)
CALCIUM SERPL-MCNC: 9.7 MG/DL (ref 8.3–10.4)
CHLORIDE SERPL-SCNC: 106 MMOL/L (ref 101–110)
CO2 SERPL-SCNC: 27 MMOL/L (ref 21–32)
CREAT SERPL-MCNC: 0.9 MG/DL (ref 0.8–1.5)
GLUCOSE SERPL-MCNC: 97 MG/DL (ref 65–100)
POTASSIUM SERPL-SCNC: 3.9 MMOL/L (ref 3.5–5.1)
SODIUM SERPL-SCNC: 141 MMOL/L (ref 133–143)

## 2022-10-17 PROCEDURE — G8417 CALC BMI ABV UP PARAM F/U: HCPCS | Performed by: INTERNAL MEDICINE

## 2022-10-17 PROCEDURE — 99213 OFFICE O/P EST LOW 20 MIN: CPT | Performed by: INTERNAL MEDICINE

## 2022-10-17 PROCEDURE — G8427 DOCREV CUR MEDS BY ELIG CLIN: HCPCS | Performed by: INTERNAL MEDICINE

## 2022-10-17 PROCEDURE — 3017F COLORECTAL CA SCREEN DOC REV: CPT | Performed by: INTERNAL MEDICINE

## 2022-10-17 PROCEDURE — 1123F ACP DISCUSS/DSCN MKR DOCD: CPT | Performed by: INTERNAL MEDICINE

## 2022-10-17 PROCEDURE — G8484 FLU IMMUNIZE NO ADMIN: HCPCS | Performed by: INTERNAL MEDICINE

## 2022-10-17 PROCEDURE — 1036F TOBACCO NON-USER: CPT | Performed by: INTERNAL MEDICINE

## 2022-10-17 RX ORDER — FUROSEMIDE 20 MG/1
20 TABLET ORAL PRN
Qty: 10 TABLET | Refills: 0 | Status: SHIPPED | OUTPATIENT
Start: 2022-10-17

## 2022-10-17 ASSESSMENT — ENCOUNTER SYMPTOMS: SHORTNESS OF BREATH: 0

## 2022-10-17 ASSESSMENT — PATIENT HEALTH QUESTIONNAIRE - PHQ9
SUM OF ALL RESPONSES TO PHQ QUESTIONS 1-9: 0
2. FEELING DOWN, DEPRESSED OR HOPELESS: 0
SUM OF ALL RESPONSES TO PHQ QUESTIONS 1-9: 0
SUM OF ALL RESPONSES TO PHQ QUESTIONS 1-9: 0
SUM OF ALL RESPONSES TO PHQ9 QUESTIONS 1 & 2: 0
SUM OF ALL RESPONSES TO PHQ QUESTIONS 1-9: 0
1. LITTLE INTEREST OR PLEASURE IN DOING THINGS: 0
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
## 2022-10-17 NOTE — TELEPHONE ENCOUNTER
Mary London from [facility] ([facility] called. Pt said her noted lower leg swelling amanda his ankles past week. In the past when he had swelling he would elevated legs and swelling would dissipated but this week swelling had not gone down.  I made pt an stephanie today with Dr Misha Murrieta

## 2022-10-28 ENCOUNTER — TELEPHONE (OUTPATIENT)
Dept: INTERNAL MEDICINE CLINIC | Facility: CLINIC | Age: 77
End: 2022-10-28

## 2022-10-28 NOTE — TELEPHONE ENCOUNTER
Pt is wondering about voluntary recall regarding carcinogens in the below medicine:     -hydroCHLOROthiazide (HYDRODIURIL) 25 MG tablet    He was advised to contact his PCP to find out if he should discontinue this medication.

## 2022-11-03 DIAGNOSIS — I10 ESSENTIAL (PRIMARY) HYPERTENSION: ICD-10-CM

## 2022-11-04 RX ORDER — LOSARTAN POTASSIUM 50 MG/1
TABLET ORAL
Qty: 90 TABLET | Refills: 1 | Status: SHIPPED | OUTPATIENT
Start: 2022-11-04

## 2023-02-27 DIAGNOSIS — C61 MALIGNANT NEOPLASM OF PROSTATE (HCC): Primary | ICD-10-CM

## 2023-03-07 DIAGNOSIS — R07.89 OTHER CHEST PAIN: ICD-10-CM

## 2023-03-07 DIAGNOSIS — E78.5 HYPERLIPIDEMIA, UNSPECIFIED: ICD-10-CM

## 2023-03-07 DIAGNOSIS — I10 ESSENTIAL (PRIMARY) HYPERTENSION: ICD-10-CM

## 2023-03-07 RX ORDER — AMLODIPINE BESYLATE 10 MG/1
TABLET ORAL
Qty: 90 TABLET | Refills: 3 | Status: SHIPPED | OUTPATIENT
Start: 2023-03-07

## 2023-03-07 RX ORDER — ATORVASTATIN CALCIUM 20 MG/1
TABLET, FILM COATED ORAL
Qty: 90 TABLET | Refills: 3 | Status: SHIPPED | OUTPATIENT
Start: 2023-03-07

## 2023-03-30 DIAGNOSIS — N40.0 BENIGN PROSTATIC HYPERPLASIA WITHOUT LOWER URINARY TRACT SYMPTOMS: ICD-10-CM

## 2023-03-30 RX ORDER — TAMSULOSIN HYDROCHLORIDE 0.4 MG/1
CAPSULE ORAL
Qty: 90 CAPSULE | Refills: 4 | Status: SHIPPED | OUTPATIENT
Start: 2023-03-30

## 2023-04-19 ENCOUNTER — OFFICE VISIT (OUTPATIENT)
Dept: INTERNAL MEDICINE CLINIC | Facility: CLINIC | Age: 78
End: 2023-04-19
Payer: MEDICARE

## 2023-04-19 VITALS
WEIGHT: 192 LBS | BODY MASS INDEX: 30.13 KG/M2 | DIASTOLIC BLOOD PRESSURE: 74 MMHG | HEIGHT: 67 IN | SYSTOLIC BLOOD PRESSURE: 104 MMHG

## 2023-04-19 DIAGNOSIS — R74.8 ELEVATED LIVER ENZYMES: ICD-10-CM

## 2023-04-19 DIAGNOSIS — I10 ESSENTIAL (PRIMARY) HYPERTENSION: ICD-10-CM

## 2023-04-19 DIAGNOSIS — K29.00 ACUTE GASTRITIS WITHOUT HEMORRHAGE, UNSPECIFIED GASTRITIS TYPE: Primary | ICD-10-CM

## 2023-04-19 DIAGNOSIS — R74.8 ELEVATED LIPASE: ICD-10-CM

## 2023-04-19 PROCEDURE — 1123F ACP DISCUSS/DSCN MKR DOCD: CPT | Performed by: INTERNAL MEDICINE

## 2023-04-19 PROCEDURE — 1036F TOBACCO NON-USER: CPT | Performed by: INTERNAL MEDICINE

## 2023-04-19 PROCEDURE — 3074F SYST BP LT 130 MM HG: CPT | Performed by: INTERNAL MEDICINE

## 2023-04-19 PROCEDURE — G8427 DOCREV CUR MEDS BY ELIG CLIN: HCPCS | Performed by: INTERNAL MEDICINE

## 2023-04-19 PROCEDURE — 99214 OFFICE O/P EST MOD 30 MIN: CPT | Performed by: INTERNAL MEDICINE

## 2023-04-19 PROCEDURE — 3017F COLORECTAL CA SCREEN DOC REV: CPT | Performed by: INTERNAL MEDICINE

## 2023-04-19 PROCEDURE — G8417 CALC BMI ABV UP PARAM F/U: HCPCS | Performed by: INTERNAL MEDICINE

## 2023-04-19 PROCEDURE — 3078F DIAST BP <80 MM HG: CPT | Performed by: INTERNAL MEDICINE

## 2023-04-19 RX ORDER — OMEPRAZOLE 40 MG/1
40 CAPSULE, DELAYED RELEASE ORAL
Qty: 30 CAPSULE | Refills: 1 | Status: SHIPPED | OUTPATIENT
Start: 2023-04-19

## 2023-04-19 RX ORDER — LOSARTAN POTASSIUM 50 MG/1
50 TABLET ORAL DAILY
Qty: 90 TABLET | Refills: 3 | Status: SHIPPED | OUTPATIENT
Start: 2023-04-19

## 2023-04-19 RX ORDER — DICYCLOMINE HCL 20 MG
20 TABLET ORAL 4 TIMES DAILY
COMMUNITY
Start: 2023-04-18

## 2023-04-19 SDOH — ECONOMIC STABILITY: HOUSING INSECURITY
IN THE LAST 12 MONTHS, WAS THERE A TIME WHEN YOU DID NOT HAVE A STEADY PLACE TO SLEEP OR SLEPT IN A SHELTER (INCLUDING NOW)?: NO

## 2023-04-19 SDOH — ECONOMIC STABILITY: FOOD INSECURITY: WITHIN THE PAST 12 MONTHS, THE FOOD YOU BOUGHT JUST DIDN'T LAST AND YOU DIDN'T HAVE MONEY TO GET MORE.: NEVER TRUE

## 2023-04-19 SDOH — ECONOMIC STABILITY: INCOME INSECURITY: HOW HARD IS IT FOR YOU TO PAY FOR THE VERY BASICS LIKE FOOD, HOUSING, MEDICAL CARE, AND HEATING?: NOT HARD AT ALL

## 2023-04-19 SDOH — ECONOMIC STABILITY: FOOD INSECURITY: WITHIN THE PAST 12 MONTHS, YOU WORRIED THAT YOUR FOOD WOULD RUN OUT BEFORE YOU GOT MONEY TO BUY MORE.: NEVER TRUE

## 2023-04-19 ASSESSMENT — PATIENT HEALTH QUESTIONNAIRE - PHQ9
SUM OF ALL RESPONSES TO PHQ QUESTIONS 1-9: 0
SUM OF ALL RESPONSES TO PHQ9 QUESTIONS 1 & 2: 0
SUM OF ALL RESPONSES TO PHQ QUESTIONS 1-9: 0
2. FEELING DOWN, DEPRESSED OR HOPELESS: 0
1. LITTLE INTEREST OR PLEASURE IN DOING THINGS: 0

## 2023-04-19 ASSESSMENT — ENCOUNTER SYMPTOMS
VOMITING: 1
BACK PAIN: 1
ABDOMINAL PAIN: 1
NAUSEA: 1

## 2023-04-19 NOTE — PROGRESS NOTES
SUBJECTIVE:   Josy Aragon is a 78 y.o. male seen for a visit regarding   Chief Complaint   Patient presents with    Follow-Up from Hospital     Pt here for ER f/u Pt went to ER in [address] c/o pain to center of chest         Abdominal Pain  This is a new (seen [address] ER--has 1 wine per night) problem. The current episode started yesterday. The onset quality is sudden. The most recent episode lasted 5 hours. The problem has been resolved. The pain is located in the epigastric region. The pain is at a severity of 9/10. The abdominal pain radiates to the chest. Associated symptoms include nausea and vomiting. Relieved by: given IV med in ER with resolution. Prior diagnostic workup includes CT scan and ultrasound (US GB ok(polyp); CT gastritis changes; pancreas ok-lipase 322; , ; alk 229). Past Medical History, Past Surgical History, Family history, Social History, and Medications were all reviewed with the patient today and updated as necessary.        Current Outpatient Medications   Medication Sig Dispense Refill    dicyclomine (BENTYL) 20 MG tablet Take 1 tablet by mouth 4 times daily      losartan (COZAAR) 50 MG tablet Take 1 tablet by mouth daily 90 tablet 3    omeprazole (PRILOSEC) 40 MG delayed release capsule Take 1 capsule by mouth every morning (before breakfast) 30 capsule 1    tamsulosin (FLOMAX) 0.4 MG capsule TAKE 1 CAPSULE BY MOUTH EVERY DAY 90 capsule 4    amLODIPine (NORVASC) 10 MG tablet TAKE 1 TABLET BY MOUTH EVERY DAY 90 tablet 3    atorvastatin (LIPITOR) 20 MG tablet TAKE 1 TABLET BY MOUTH EVERY DAY 90 tablet 3    Zinc Sulfate (ZINC 15 PO) Take 1 tablet by mouth daily      furosemide (LASIX) 20 MG tablet Take 1 tablet by mouth as needed (swelling) 10 tablet 0    Cholecalciferol 50 MCG (2000 UT) TABS Take 1 tablet by mouth daily      fexofenadine (ALLEGRA) 180 MG tablet Take 1 tablet by mouth daily      glucosamine-chondroitin 500-400 MG CAPS Take 1 capsule by mouth daily

## 2023-04-20 ENCOUNTER — TELEPHONE (OUTPATIENT)
Dept: INTERNAL MEDICINE CLINIC | Facility: CLINIC | Age: 78
End: 2023-04-20

## 2023-04-24 DIAGNOSIS — I10 ESSENTIAL (PRIMARY) HYPERTENSION: ICD-10-CM

## 2023-04-25 DIAGNOSIS — Z88.9 ALLERGY STATUS TO UNSPECIFIED DRUGS, MEDICAMENTS AND BIOLOGICAL SUBSTANCES: ICD-10-CM

## 2023-04-25 RX ORDER — MONTELUKAST SODIUM 10 MG/1
TABLET ORAL
Qty: 90 TABLET | Refills: 4 | Status: SHIPPED | OUTPATIENT
Start: 2023-04-25

## 2023-04-25 RX ORDER — HYDROCHLOROTHIAZIDE 25 MG/1
TABLET ORAL
Qty: 90 TABLET | Refills: 0 | Status: SHIPPED | OUTPATIENT
Start: 2023-04-25

## 2023-05-01 DIAGNOSIS — R12 HEARTBURN: ICD-10-CM

## 2023-05-01 RX ORDER — OMEPRAZOLE 20 MG/1
CAPSULE, DELAYED RELEASE ORAL
Qty: 90 CAPSULE | Refills: 4 | OUTPATIENT
Start: 2023-05-01

## 2023-05-09 ENCOUNTER — HOSPITAL ENCOUNTER (OUTPATIENT)
Dept: GENERAL RADIOLOGY | Age: 78
Discharge: HOME | End: 2023-05-12

## 2023-05-09 ENCOUNTER — OFFICE VISIT (OUTPATIENT)
Dept: INTERNAL MEDICINE CLINIC | Facility: CLINIC | Age: 78
End: 2023-05-09
Payer: MEDICARE

## 2023-05-09 VITALS
WEIGHT: 185 LBS | BODY MASS INDEX: 29.03 KG/M2 | DIASTOLIC BLOOD PRESSURE: 64 MMHG | SYSTOLIC BLOOD PRESSURE: 114 MMHG | HEIGHT: 67 IN

## 2023-05-09 DIAGNOSIS — R93.89 ABNORMAL CXR: ICD-10-CM

## 2023-05-09 DIAGNOSIS — R79.89 ELEVATED LIVER FUNCTION TESTS: ICD-10-CM

## 2023-05-09 DIAGNOSIS — I10 ESSENTIAL (PRIMARY) HYPERTENSION: ICD-10-CM

## 2023-05-09 DIAGNOSIS — R74.8 ELEVATED LIPASE: ICD-10-CM

## 2023-05-09 DIAGNOSIS — R79.89 ELEVATED LIVER FUNCTION TESTS: Primary | ICD-10-CM

## 2023-05-09 PROCEDURE — G8417 CALC BMI ABV UP PARAM F/U: HCPCS | Performed by: INTERNAL MEDICINE

## 2023-05-09 PROCEDURE — 1123F ACP DISCUSS/DSCN MKR DOCD: CPT | Performed by: INTERNAL MEDICINE

## 2023-05-09 PROCEDURE — 99214 OFFICE O/P EST MOD 30 MIN: CPT | Performed by: INTERNAL MEDICINE

## 2023-05-09 PROCEDURE — 3074F SYST BP LT 130 MM HG: CPT | Performed by: INTERNAL MEDICINE

## 2023-05-09 PROCEDURE — 3078F DIAST BP <80 MM HG: CPT | Performed by: INTERNAL MEDICINE

## 2023-05-09 PROCEDURE — 1036F TOBACCO NON-USER: CPT | Performed by: INTERNAL MEDICINE

## 2023-05-09 PROCEDURE — 3017F COLORECTAL CA SCREEN DOC REV: CPT | Performed by: INTERNAL MEDICINE

## 2023-05-09 PROCEDURE — G8427 DOCREV CUR MEDS BY ELIG CLIN: HCPCS | Performed by: INTERNAL MEDICINE

## 2023-05-09 RX ORDER — HYDROCHLOROTHIAZIDE 25 MG/1
25 TABLET ORAL DAILY
Qty: 90 TABLET | Refills: 3 | Status: SHIPPED | OUTPATIENT
Start: 2023-05-09

## 2023-05-09 ASSESSMENT — PATIENT HEALTH QUESTIONNAIRE - PHQ9
1. LITTLE INTEREST OR PLEASURE IN DOING THINGS: 0
SUM OF ALL RESPONSES TO PHQ QUESTIONS 1-9: 0
SUM OF ALL RESPONSES TO PHQ QUESTIONS 1-9: 0
SUM OF ALL RESPONSES TO PHQ9 QUESTIONS 1 & 2: 0
SUM OF ALL RESPONSES TO PHQ QUESTIONS 1-9: 0
2. FEELING DOWN, DEPRESSED OR HOPELESS: 0
SUM OF ALL RESPONSES TO PHQ QUESTIONS 1-9: 0

## 2023-05-09 NOTE — PROGRESS NOTES
HPI    Past Medical History, Past Surgical History, Family history, Social History, and Medications were all reviewed with the patient today and updated as necessary. Current Outpatient Medications   Medication Sig Dispense Refill    hydroCHLOROthiazide (HYDRODIURIL) 25 MG tablet Take 1 tablet by mouth daily 90 tablet 3    montelukast (SINGULAIR) 10 MG tablet TAKE 1 TABLET BY MOUTH EVERY DAY 90 tablet 4    losartan (COZAAR) 50 MG tablet Take 1 tablet by mouth daily 90 tablet 3    omeprazole (PRILOSEC) 40 MG delayed release capsule Take 1 capsule by mouth every morning (before breakfast) 30 capsule 1    tamsulosin (FLOMAX) 0.4 MG capsule TAKE 1 CAPSULE BY MOUTH EVERY DAY 90 capsule 4    amLODIPine (NORVASC) 10 MG tablet TAKE 1 TABLET BY MOUTH EVERY DAY 90 tablet 3    atorvastatin (LIPITOR) 20 MG tablet TAKE 1 TABLET BY MOUTH EVERY DAY 90 tablet 3    Zinc Sulfate (ZINC 15 PO) Take 1 tablet by mouth daily      Cholecalciferol 50 MCG (2000 UT) TABS Take 1 tablet by mouth daily      fexofenadine (ALLEGRA) 180 MG tablet Take 1 tablet by mouth daily      glucosamine-chondroitin 500-400 MG CAPS Take 1 capsule by mouth daily      dicyclomine (BENTYL) 20 MG tablet Take 1 tablet by mouth 4 times daily      furosemide (LASIX) 20 MG tablet Take 1 tablet by mouth as needed (swelling) 10 tablet 0     No current facility-administered medications for this visit.      Allergies   Allergen Reactions    Latex Rash    Esomeprazole Magnesium Other (See Comments)     Restless legs, insomnia    Sildenafil Other (See Comments)     Visual disturbances    Aminobenzoate Rash    Fluorouracil Rash    Morphine Nausea And Vomiting    Tretinoin Rash     Patient Active Problem List   Diagnosis    SNHL (sensorineural hearing loss)    Screening for viral disease    Personal history of prostate cancer    Benign essential HTN    Essential tremor    GERD (gastroesophageal reflux disease)    Chronic insomnia    Hyperlipidemia    H/O seasonal

## 2023-05-10 LAB
ALBUMIN SERPL-MCNC: 3.7 G/DL (ref 3.2–4.6)
ALBUMIN/GLOB SERPL: 0.9 (ref 0.4–1.6)
ALP SERPL-CCNC: 167 U/L (ref 50–136)
ALT SERPL-CCNC: 44 U/L (ref 12–65)
AST SERPL-CCNC: 25 U/L (ref 15–37)
BILIRUB DIRECT SERPL-MCNC: 0.2 MG/DL
BILIRUB SERPL-MCNC: 0.7 MG/DL (ref 0.2–1.1)
GLOBULIN SER CALC-MCNC: 4.1 G/DL (ref 2.8–4.5)
LIPASE SERPL-CCNC: 236 U/L (ref 73–393)
PROT SERPL-MCNC: 7.8 G/DL (ref 6.3–8.2)

## 2023-05-13 DIAGNOSIS — K29.00 ACUTE GASTRITIS WITHOUT HEMORRHAGE, UNSPECIFIED GASTRITIS TYPE: ICD-10-CM

## 2023-05-15 RX ORDER — OMEPRAZOLE 40 MG/1
CAPSULE, DELAYED RELEASE ORAL
Qty: 90 CAPSULE | Refills: 1 | Status: SHIPPED | OUTPATIENT
Start: 2023-05-15

## 2023-05-31 ENCOUNTER — OFFICE VISIT (OUTPATIENT)
Dept: UROLOGY | Age: 78
End: 2023-05-31
Payer: MEDICARE

## 2023-05-31 DIAGNOSIS — C61 MALIGNANT NEOPLASM OF PROSTATE (HCC): Primary | ICD-10-CM

## 2023-05-31 DIAGNOSIS — N40.1 BPH WITH OBSTRUCTION/LOWER URINARY TRACT SYMPTOMS: ICD-10-CM

## 2023-05-31 DIAGNOSIS — N13.8 BPH WITH OBSTRUCTION/LOWER URINARY TRACT SYMPTOMS: ICD-10-CM

## 2023-05-31 LAB
BILIRUBIN, URINE, POC: NEGATIVE
BLOOD URINE, POC: NORMAL
GLUCOSE URINE, POC: NEGATIVE
KETONES, URINE, POC: NEGATIVE
LEUKOCYTE ESTERASE, URINE, POC: NEGATIVE
NITRITE, URINE, POC: NEGATIVE
PH, URINE, POC: 7 (ref 4.6–8)
PROTEIN,URINE, POC: NEGATIVE
SPECIFIC GRAVITY, URINE, POC: 1.02 (ref 1–1.03)
URINALYSIS CLARITY, POC: NORMAL
URINALYSIS COLOR, POC: NORMAL
UROBILINOGEN, POC: NORMAL

## 2023-05-31 PROCEDURE — 1123F ACP DISCUSS/DSCN MKR DOCD: CPT | Performed by: UROLOGY

## 2023-05-31 PROCEDURE — 3017F COLORECTAL CA SCREEN DOC REV: CPT | Performed by: UROLOGY

## 2023-05-31 PROCEDURE — 99213 OFFICE O/P EST LOW 20 MIN: CPT | Performed by: UROLOGY

## 2023-05-31 PROCEDURE — G8417 CALC BMI ABV UP PARAM F/U: HCPCS | Performed by: UROLOGY

## 2023-05-31 PROCEDURE — 81003 URINALYSIS AUTO W/O SCOPE: CPT | Performed by: UROLOGY

## 2023-05-31 PROCEDURE — 1036F TOBACCO NON-USER: CPT | Performed by: UROLOGY

## 2023-05-31 PROCEDURE — G8427 DOCREV CUR MEDS BY ELIG CLIN: HCPCS | Performed by: UROLOGY

## 2023-05-31 NOTE — PROGRESS NOTES
\"PROSTATE, REGION OF INTEREST #1 RIGHT APEX, BIOPSY\": FOCAL HIGH GRADE PROSTATIC INTRAEPITHELIAL NEOPLASIA WITH ADJACENT ATYPICAL GLANDS SUSPICIOUS FOR CARCINOMA. B: \"PROSTATE, RIGHT APEX, BIOPSY\": PROSTATIC ADENOCARCINOMA, KAYLI SCORE   3 + 3 = 6 (GRADE GROUP 1), INVOLVING 5% OF BIOPSY. .      stage T1c, grade 3+3 prostate cancer, present in 5% of tissue in one biopsy . Chen Brown PSA 12.2. Pt is healthy. Has ED. Stream is good . would be intermediate risk because of the PSA >10. He would like to have at least short term AS. Prolaris score is 3.8 which is 79th percentile for favorable intermediate patient. 3.9% DSM after 10 years if untreated. PSA 6.0 in 5-20. PSA 7.2 in 11-20. MRI pelvis 1-15-21: Findings:  The prostate gland measures: 5.7 cm transverse by 5.9 cm AP by 5.1 cm  craniocaudal.     Peripheral Zone: Asymmetric signal loss is once again seen in the right apical  peripheral zone on ADC map image 35. The degree of signal loss on this sequence  does appear worsened when compared to the prior study with this confluent  abnormality now measuring 12 mm x 10 mm in size. Although signal changes on the  prior study demonstrated a similar size, prior signal changes appeared  nonconfluent. Early focal enhancement is seen associated with this lesion best  appreciated on axial postcontrast image 43. Some progression of this lesion is  not excluded. No evolving of fine peripheral zone lesion is otherwise seen. Asymmetric atrophy is once again seen of the right peripheral zone at the base  and mid gland. Central Gland: Moderate to severe enlargement of the central gland is once again  seen. Signal changes of the transitional zone are not definitely changed without  a worrisome evolving asymmetric lesion. Prostate capsule: No evolving contour abnormality or gross extracapsular  enhancing tumor. Seminal vesicles: No evidence for involvement. Neurovascular bundles: No clear evidence for involvement.

## 2023-11-16 LAB — PSA SERPL-MCNC: 8.2 NG/ML (ref 0–4)

## 2023-11-29 ENCOUNTER — OFFICE VISIT (OUTPATIENT)
Dept: UROLOGY | Age: 78
End: 2023-11-29
Payer: MEDICARE

## 2023-11-29 DIAGNOSIS — C61 MALIGNANT NEOPLASM OF PROSTATE (HCC): Primary | ICD-10-CM

## 2023-11-29 DIAGNOSIS — N13.8 BPH WITH OBSTRUCTION/LOWER URINARY TRACT SYMPTOMS: ICD-10-CM

## 2023-11-29 DIAGNOSIS — N40.1 BPH WITH OBSTRUCTION/LOWER URINARY TRACT SYMPTOMS: ICD-10-CM

## 2023-11-29 LAB
BILIRUBIN, URINE, POC: NEGATIVE
BLOOD URINE, POC: NEGATIVE
GLUCOSE URINE, POC: NEGATIVE
KETONES, URINE, POC: NEGATIVE
LEUKOCYTE ESTERASE, URINE, POC: NEGATIVE
NITRITE, URINE, POC: NEGATIVE
PH, URINE, POC: 6 (ref 4.6–8)
PROTEIN,URINE, POC: NEGATIVE
SPECIFIC GRAVITY, URINE, POC: 1.03 (ref 1–1.03)
URINALYSIS CLARITY, POC: NORMAL
URINALYSIS COLOR, POC: NORMAL
UROBILINOGEN, POC: NORMAL

## 2023-11-29 PROCEDURE — G8417 CALC BMI ABV UP PARAM F/U: HCPCS | Performed by: UROLOGY

## 2023-11-29 PROCEDURE — 1123F ACP DISCUSS/DSCN MKR DOCD: CPT | Performed by: UROLOGY

## 2023-11-29 PROCEDURE — 1036F TOBACCO NON-USER: CPT | Performed by: UROLOGY

## 2023-11-29 PROCEDURE — G8484 FLU IMMUNIZE NO ADMIN: HCPCS | Performed by: UROLOGY

## 2023-11-29 PROCEDURE — G8428 CUR MEDS NOT DOCUMENT: HCPCS | Performed by: UROLOGY

## 2023-11-29 PROCEDURE — 99213 OFFICE O/P EST LOW 20 MIN: CPT | Performed by: UROLOGY

## 2023-11-29 PROCEDURE — 81003 URINALYSIS AUTO W/O SCOPE: CPT | Performed by: UROLOGY

## 2023-11-29 ASSESSMENT — ENCOUNTER SYMPTOMS
BACK PAIN: 0
NAUSEA: 0

## 2023-11-29 NOTE — PROGRESS NOTES
[facility]  [address] [address]  Lisa Morales  : 1945    Chief Complaint   Patient presents with    Follow-up    Prostate Cancer        HPI     Schuyler Messina is a 70 y.o. male   with history of elevated PSA, low risk prostate cancer on AS, and LUTS, s/p Greenlight laser . Serenity Castlemarixa The patient has had one previous prostate biopsy. The biopsy results were negative. PSA was 4.01 in 2009. Serenity Castlemarixa PSA is 5.938 in May 2010. Serenity Castmaurizio Total PSA 5.24, 31 % free PSA in 3-09. Had negative prostate biopsy in . MEASUREMENTS:   Volume: 62 ml   PSA DENSITY:0.06 ( nl < 0.15)   PSA 4.7 then. PSA:5.938 ( 5/6/10)   PSA 7.14 in . Total PSA of 5.52 with 18% free PSA in . PSA 4.42 in . pSA 2.13 in . Pt. with history of BPH with Eliverto Blizzard laser photovaporization of the prostate in . Started on Flomax and is voiding well. PSA 2.38 (2/15/13). PSA 2.6 in -. PSA 3.5 in 2015, 4.1 in 8-15. PSA 4.4 in 11-15. In 11-15, total PSA 4.9 with 42% free PSA. PSA 4.3 in -, 5.1 in -17. In , total PSA 5.0 with 39.8% free PSA. PSA 5.4 in 3-19. PSA 7.5 in 10-19 and 12.2 in . MRI 20: FINDINGS:   -PROSTATE SIZE: 5.6 x 4.5 x 5.7 cm   -CENTRAL GLAND: Moderate central gland hypertrophy. No discrete mass. -PERIPHERAL GLAND: Relative low signal in the right apex with corresponding   restricted diffusion. No other discrete peripheral zone lesions. -CAPSULE: Capsule appears intact. No perineural tumor. -SEMINAL VESICLES: Normal.   -LYMPH NODES: No significant adenopathy. -BONES: No bony lesions. Degenerative disc disease at L5-S1.   -BLADDER/RECTUM: Normal.   IMPRESSION   IMPRESSION:   1. Small right apex lesion concerning for malignancy. PI-Rads 4   2.  No evidence of adenopathy or metastatic disease in the lower abdomen/pelvis      MRI fusion biopsy 20: PROSTATE VOLUME: 68 gm  PSA 12.2 PSAD 0.18   Path:

## 2024-02-25 DIAGNOSIS — R07.89 OTHER CHEST PAIN: ICD-10-CM

## 2024-02-25 DIAGNOSIS — I10 ESSENTIAL (PRIMARY) HYPERTENSION: ICD-10-CM

## 2024-02-26 RX ORDER — AMLODIPINE BESYLATE 10 MG/1
TABLET ORAL
Qty: 90 TABLET | Refills: 3 | OUTPATIENT
Start: 2024-02-26

## 2024-06-22 DIAGNOSIS — N40.0 BENIGN PROSTATIC HYPERPLASIA WITHOUT LOWER URINARY TRACT SYMPTOMS: ICD-10-CM

## 2024-06-24 RX ORDER — TAMSULOSIN HYDROCHLORIDE 0.4 MG/1
CAPSULE ORAL
Qty: 90 CAPSULE | Refills: 4 | OUTPATIENT
Start: 2024-06-24

## 2024-07-06 DIAGNOSIS — I10 ESSENTIAL (PRIMARY) HYPERTENSION: ICD-10-CM

## 2024-07-08 RX ORDER — HYDROCHLOROTHIAZIDE 25 MG/1
25 TABLET ORAL DAILY
Qty: 90 TABLET | Refills: 3 | OUTPATIENT
Start: 2024-07-08

## 2024-07-21 DIAGNOSIS — N40.0 BENIGN PROSTATIC HYPERPLASIA WITHOUT LOWER URINARY TRACT SYMPTOMS: ICD-10-CM

## 2024-07-22 RX ORDER — TAMSULOSIN HYDROCHLORIDE 0.4 MG/1
CAPSULE ORAL
Qty: 90 CAPSULE | Refills: 4 | OUTPATIENT
Start: 2024-07-22

## 2024-08-10 LAB — PSA SERPL-MCNC: 8.1 NG/ML (ref 0–4)

## 2024-08-28 ENCOUNTER — OFFICE VISIT (OUTPATIENT)
Dept: UROLOGY | Age: 79
End: 2024-08-28
Payer: MEDICARE

## 2024-08-28 DIAGNOSIS — N13.8 BPH WITH OBSTRUCTION/LOWER URINARY TRACT SYMPTOMS: ICD-10-CM

## 2024-08-28 DIAGNOSIS — C61 MALIGNANT NEOPLASM OF PROSTATE (HCC): Primary | ICD-10-CM

## 2024-08-28 DIAGNOSIS — N40.1 BPH WITH OBSTRUCTION/LOWER URINARY TRACT SYMPTOMS: ICD-10-CM

## 2024-08-28 LAB
BILIRUBIN, URINE, POC: NEGATIVE
BLOOD URINE, POC: NEGATIVE
GLUCOSE URINE, POC: NEGATIVE
KETONES, URINE, POC: NEGATIVE
LEUKOCYTE ESTERASE, URINE, POC: NEGATIVE
NITRITE, URINE, POC: NEGATIVE
PH, URINE, POC: 7 (ref 4.6–8)
PROTEIN,URINE, POC: NEGATIVE
SPECIFIC GRAVITY, URINE, POC: 1.02 (ref 1–1.03)
URINALYSIS CLARITY, POC: NORMAL
URINALYSIS COLOR, POC: NORMAL
UROBILINOGEN, POC: NORMAL

## 2024-08-28 PROCEDURE — G8421 BMI NOT CALCULATED: HCPCS | Performed by: UROLOGY

## 2024-08-28 PROCEDURE — 81003 URINALYSIS AUTO W/O SCOPE: CPT | Performed by: UROLOGY

## 2024-08-28 PROCEDURE — 1123F ACP DISCUSS/DSCN MKR DOCD: CPT | Performed by: UROLOGY

## 2024-08-28 PROCEDURE — G8427 DOCREV CUR MEDS BY ELIG CLIN: HCPCS | Performed by: UROLOGY

## 2024-08-28 PROCEDURE — 1036F TOBACCO NON-USER: CPT | Performed by: UROLOGY

## 2024-08-28 PROCEDURE — 99213 OFFICE O/P EST LOW 20 MIN: CPT | Performed by: UROLOGY

## 2024-08-28 ASSESSMENT — ENCOUNTER SYMPTOMS
BACK PAIN: 0
NAUSEA: 0

## 2024-08-28 NOTE — PROGRESS NOTES
PROSTATE      COLONOSCOPY      HEENT      TONSILLECTOMY AS A CHILD     HERNIA REPAIR  05/2006    INGUINAL     MALIGNANT SKIN LESION EXCISION      BCC RESECTION    ORTHOPEDIC SURGERY      rt foot ganglion cyst    OTHER SURGICAL HISTORY  03/2013    SKIN SURGERY- melanoma    OTHER SURGICAL HISTORY      ingrown toenail    PROSTATECTOMY      prostate bx;green light surgery    UPPER GASTROINTESTINAL ENDOSCOPY      UROLOGICAL SURGERY  2012    CYSTOSCOPY AND CAUTERIZATION OF PROSTATE AFTER BLEEDING FROM CATH    UROLOGICAL SURGERY  2007    PROSTATE LASER PROCEDURE      Current Outpatient Medications   Medication Sig Dispense Refill    omeprazole (PRILOSEC) 40 MG delayed release capsule TAKE 1 CAPSULE BY MOUTH EVERY DAY IN THE MORNING BEFORE BREAKFAST 90 capsule 1    hydroCHLOROthiazide (HYDRODIURIL) 25 MG tablet Take 1 tablet by mouth daily 90 tablet 3    montelukast (SINGULAIR) 10 MG tablet TAKE 1 TABLET BY MOUTH EVERY DAY 90 tablet 4    losartan (COZAAR) 50 MG tablet Take 1 tablet by mouth daily 90 tablet 3    tamsulosin (FLOMAX) 0.4 MG capsule TAKE 1 CAPSULE BY MOUTH EVERY DAY 90 capsule 4    amLODIPine (NORVASC) 10 MG tablet TAKE 1 TABLET BY MOUTH EVERY DAY 90 tablet 3    atorvastatin (LIPITOR) 20 MG tablet TAKE 1 TABLET BY MOUTH EVERY DAY 90 tablet 3    Zinc Sulfate (ZINC 15 PO) Take 1 tablet by mouth daily      Cholecalciferol 50 MCG (2000 UT) TABS Take 1 tablet by mouth daily      fexofenadine (ALLEGRA) 180 MG tablet Take 1 tablet by mouth daily      glucosamine-chondroitin 500-400 MG CAPS Take 1 capsule by mouth daily       No current facility-administered medications for this visit.     Allergies   Allergen Reactions    Latex Rash    Esomeprazole Magnesium Other (See Comments)     Restless legs, insomnia    Sildenafil Other (See Comments)     Visual disturbances    Aminobenzoate Rash    Fluorouracil Rash    Morphine Nausea And Vomiting    Tretinoin Rash     Social History     Socioeconomic History    Marital status:

## 2025-02-25 LAB — PSA SERPL-MCNC: 9.3 NG/ML (ref 0–4)

## 2025-03-05 ENCOUNTER — OFFICE VISIT (OUTPATIENT)
Dept: UROLOGY | Age: 80
End: 2025-03-05
Payer: MEDICARE

## 2025-03-05 DIAGNOSIS — C61 MALIGNANT NEOPLASM OF PROSTATE (HCC): Primary | ICD-10-CM

## 2025-03-05 DIAGNOSIS — N40.1 BPH WITH OBSTRUCTION/LOWER URINARY TRACT SYMPTOMS: ICD-10-CM

## 2025-03-05 DIAGNOSIS — N13.8 BPH WITH OBSTRUCTION/LOWER URINARY TRACT SYMPTOMS: ICD-10-CM

## 2025-03-05 LAB
BILIRUBIN, URINE, POC: NEGATIVE
BLOOD URINE, POC: NEGATIVE
GLUCOSE URINE, POC: NEGATIVE MG/DL
KETONES, URINE, POC: NEGATIVE MG/DL
LEUKOCYTE ESTERASE, URINE, POC: NEGATIVE
NITRITE, URINE, POC: NEGATIVE
PH, URINE, POC: 7 (ref 4.6–8)
PROTEIN,URINE, POC: NEGATIVE MG/DL
SPECIFIC GRAVITY, URINE, POC: 1.02 (ref 1–1.03)
URINALYSIS CLARITY, POC: NORMAL
URINALYSIS COLOR, POC: NORMAL
UROBILINOGEN, POC: NORMAL MG/DL

## 2025-03-05 PROCEDURE — 1123F ACP DISCUSS/DSCN MKR DOCD: CPT | Performed by: UROLOGY

## 2025-03-05 PROCEDURE — 1159F MED LIST DOCD IN RCRD: CPT | Performed by: UROLOGY

## 2025-03-05 PROCEDURE — 1036F TOBACCO NON-USER: CPT | Performed by: UROLOGY

## 2025-03-05 PROCEDURE — 81003 URINALYSIS AUTO W/O SCOPE: CPT | Performed by: UROLOGY

## 2025-03-05 PROCEDURE — G8421 BMI NOT CALCULATED: HCPCS | Performed by: UROLOGY

## 2025-03-05 PROCEDURE — G8427 DOCREV CUR MEDS BY ELIG CLIN: HCPCS | Performed by: UROLOGY

## 2025-03-05 PROCEDURE — 99213 OFFICE O/P EST LOW 20 MIN: CPT | Performed by: UROLOGY

## 2025-03-05 ASSESSMENT — ENCOUNTER SYMPTOMS
BACK PAIN: 0
NAUSEA: 0

## 2025-03-05 NOTE — PROGRESS NOTES
Family History   Problem Relation Age of Onset    Heart Attack Mother 85        mild attack     Migraines Mother     Hypertension Mother     Migraines Daughter     Other Sister         mild tremors     Hypertension Sister     Tremors Paternal Grandfather     Other Brother         PEPTIC ULCER DISEASE    Cancer Brother     Tremors Father     Hypertension Father     Cancer Father         LUNG, brain       Review of Systems  Constitutional:   Negative for fever.  GI:  Negative for nausea.  Musculoskeletal:  Negative for back pain.      There were no vitals taken for this visit.  PE: refused JUNAID  Urinalysis  UA - Dipstick  Results for orders placed or performed in visit on 03/05/25   AMB POC URINALYSIS DIP STICK AUTO W/O MICRO    Collection Time: 03/05/25  2:41 PM   Result Value Ref Range    Color (UA POC)      Clarity (UA POC)      Glucose, Urine, POC Negative Negative mg/dL    Bilirubin, Urine, POC Negative Negative    KETONES, Urine, POC Negative Negative mg/dL    Specific Gravity, Urine, POC 1.020 1.001 - 1.035    Blood (UA POC) Negative     pH, Urine, POC 7.0 4.6 - 8.0    Protein, Urine, POC Negative Negative mg/dL    Urobilinogen, POC 1 mg/dL <1.1 mg/dL    Nitrite, Urine, POC Negative Negative    Leukocyte Esterase, Urine, POC Negative Negative       UA - Micro  WBC - 0  RBC - 0  Bacteria - 0  Epith - 0    Physical Exam    Assessment and Plan  Don \"Flo\" was seen today for follow-up.    Diagnoses and all orders for this visit:    Malignant neoplasm of prostate (HCC)  -     AMB POC URINALYSIS DIP STICK AUTO W/O MICRO  -     PSA, Diagnostic; Future    BPH with obstruction/lower urinary tract symptoms  -     AMB POC URINALYSIS DIP STICK AUTO W/O MICRO    PSA increased since last appt.   Discussed doing another MRI . Will wait for now. Consider MRI if PSA gets to 10.     Follow-up and Dispositions    Return in about 6 months (around 9/5/2025) for appt, PSA before.

## 2025-09-04 ENCOUNTER — TELEPHONE (OUTPATIENT)
Dept: UROLOGY | Age: 80
End: 2025-09-04

## 2025-09-06 LAB — PSA SERPL-MCNC: 8.1 NG/ML (ref 0–4)

## (undated) DEVICE — MAX-CORE® DISPOSABLE CORE BIOPSY INSTRUMENT, 18G X 25CM: Brand: MAX-CORE

## (undated) DEVICE — COVER PRB W1XL11.8IN ENDOCAVITY CLR E BND NEOGUARD

## (undated) DEVICE — KIT BX PROST 20ML VI PREFIL W 10ML 10% NEUT BUFF FRMLN LEAK

## (undated) DEVICE — STERILE PACKED. BORE DIAMETER 1.6 MM; ANGLE OF INSERTION 19° TO THE LONG AXIS OF THE TRANSDUCER: Brand: SINGLE-USE BIPLANE BIOPSY GUIDE

## (undated) DEVICE — Z CONVERTED USE 2421973 CONTAINER SPEC 60/30ML 10% FRMLN POLYPR PREFIL

## (undated) DEVICE — HOLDER PRB URONAV

## (undated) DEVICE — DRAPE TWL SURG 16X26IN BLU ORB04] ALLCARE INC]